# Patient Record
Sex: MALE | Race: WHITE | NOT HISPANIC OR LATINO | Employment: OTHER | URBAN - METROPOLITAN AREA
[De-identification: names, ages, dates, MRNs, and addresses within clinical notes are randomized per-mention and may not be internally consistent; named-entity substitution may affect disease eponyms.]

---

## 2018-10-03 ENCOUNTER — HOSPITAL ENCOUNTER (INPATIENT)
Facility: HOSPITAL | Age: 83
LOS: 4 days | Discharge: HOME HEALTH | DRG: 726 | End: 2018-10-07
Attending: EMERGENCY MEDICINE | Admitting: INTERNAL MEDICINE
Payer: MEDICARE

## 2018-10-03 DIAGNOSIS — N13.39 OTHER HYDRONEPHROSIS: ICD-10-CM

## 2018-10-03 DIAGNOSIS — R33.9 URINARY RETENTION WITH INCOMPLETE BLADDER EMPTYING: Primary | ICD-10-CM

## 2018-10-03 DIAGNOSIS — R33.8 ACUTE URINARY RETENTION: ICD-10-CM

## 2018-10-03 DIAGNOSIS — N19 RENAL FAILURE: ICD-10-CM

## 2018-10-03 PROBLEM — G25.0 ESSENTIAL TREMOR: Status: ACTIVE | Noted: 2018-10-03

## 2018-10-03 PROBLEM — E78.49 OTHER HYPERLIPIDEMIA: Status: ACTIVE | Noted: 2018-10-03

## 2018-10-03 PROBLEM — N17.9 ACUTE RENAL FAILURE SUPERIMPOSED ON STAGE 3 CHRONIC KIDNEY DISEASE (HCC): Status: ACTIVE | Noted: 2018-10-03

## 2018-10-03 PROBLEM — N18.30 ACUTE RENAL FAILURE SUPERIMPOSED ON STAGE 3 CHRONIC KIDNEY DISEASE (HCC): Status: ACTIVE | Noted: 2018-10-03

## 2018-10-03 LAB
ANION GAP SERPL CALCULATED.3IONS-SCNC: 11 MMOL/L (ref 4–13)
BILIRUB UR QL STRIP: NEGATIVE
BUN SERPL-MCNC: 48 MG/DL (ref 5–25)
CALCIUM SERPL-MCNC: 9.1 MG/DL (ref 8.3–10.1)
CHLORIDE SERPL-SCNC: 99 MMOL/L (ref 100–108)
CLARITY UR: CLEAR
CO2 SERPL-SCNC: 28 MMOL/L (ref 21–32)
COLOR UR: YELLOW
CREAT SERPL-MCNC: 2.36 MG/DL (ref 0.6–1.3)
GFR SERPL CREATININE-BSD FRML MDRD: 25 ML/MIN/1.73SQ M
GLUCOSE SERPL-MCNC: 106 MG/DL (ref 65–140)
GLUCOSE UR STRIP-MCNC: NEGATIVE MG/DL
HGB UR QL STRIP.AUTO: ABNORMAL
KETONES UR STRIP-MCNC: NEGATIVE MG/DL
LEUKOCYTE ESTERASE UR QL STRIP: ABNORMAL
NITRITE UR QL STRIP: NEGATIVE
PH UR STRIP.AUTO: 6 [PH] (ref 4.5–8)
POTASSIUM SERPL-SCNC: 3.8 MMOL/L (ref 3.5–5.3)
PROT UR STRIP-MCNC: NEGATIVE MG/DL
SODIUM SERPL-SCNC: 138 MMOL/L (ref 136–145)
SP GR UR STRIP.AUTO: 1.01 (ref 1–1.03)
UROBILINOGEN UR QL STRIP.AUTO: 0.2 E.U./DL

## 2018-10-03 PROCEDURE — 80048 BASIC METABOLIC PNL TOTAL CA: CPT | Performed by: EMERGENCY MEDICINE

## 2018-10-03 PROCEDURE — 51798 US URINE CAPACITY MEASURE: CPT

## 2018-10-03 PROCEDURE — G0008 ADMIN INFLUENZA VIRUS VAC: HCPCS | Performed by: INTERNAL MEDICINE

## 2018-10-03 PROCEDURE — 99285 EMERGENCY DEPT VISIT HI MDM: CPT

## 2018-10-03 PROCEDURE — 99222 1ST HOSP IP/OBS MODERATE 55: CPT | Performed by: INTERNAL MEDICINE

## 2018-10-03 PROCEDURE — 36415 COLL VENOUS BLD VENIPUNCTURE: CPT | Performed by: EMERGENCY MEDICINE

## 2018-10-03 PROCEDURE — 81002 URINALYSIS NONAUTO W/O SCOPE: CPT | Performed by: EMERGENCY MEDICINE

## 2018-10-03 PROCEDURE — 90662 IIV NO PRSV INCREASED AG IM: CPT | Performed by: INTERNAL MEDICINE

## 2018-10-03 PROCEDURE — 0T9B70Z DRAINAGE OF BLADDER WITH DRAINAGE DEVICE, VIA NATURAL OR ARTIFICIAL OPENING: ICD-10-PCS | Performed by: EMERGENCY MEDICINE

## 2018-10-03 RX ORDER — PROPRANOLOL HYDROCHLORIDE 60 MG/1
60 TABLET ORAL EVERY MORNING
COMMUNITY

## 2018-10-03 RX ORDER — ATORVASTATIN CALCIUM 40 MG/1
40 TABLET, FILM COATED ORAL DAILY
COMMUNITY
End: 2018-12-28

## 2018-10-03 RX ORDER — GABAPENTIN 100 MG/1
100 CAPSULE ORAL 2 TIMES DAILY
Status: DISCONTINUED | OUTPATIENT
Start: 2018-10-03 | End: 2018-10-07 | Stop reason: HOSPADM

## 2018-10-03 RX ORDER — TAMSULOSIN HYDROCHLORIDE 0.4 MG/1
0.4 CAPSULE ORAL
Status: DISCONTINUED | OUTPATIENT
Start: 2018-10-03 | End: 2018-10-07 | Stop reason: HOSPADM

## 2018-10-03 RX ORDER — HYDROCHLOROTHIAZIDE 25 MG/1
25 TABLET ORAL DAILY
COMMUNITY
End: 2018-10-07 | Stop reason: HOSPADM

## 2018-10-03 RX ORDER — ASPIRIN 81 MG/1
81 TABLET ORAL DAILY
Status: DISCONTINUED | OUTPATIENT
Start: 2018-10-04 | End: 2018-10-07 | Stop reason: HOSPADM

## 2018-10-03 RX ORDER — GABAPENTIN 100 MG/1
100 CAPSULE ORAL 2 TIMES DAILY
COMMUNITY

## 2018-10-03 RX ORDER — HEPARIN SODIUM 5000 [USP'U]/ML
5000 INJECTION, SOLUTION INTRAVENOUS; SUBCUTANEOUS EVERY 8 HOURS SCHEDULED
Status: DISCONTINUED | OUTPATIENT
Start: 2018-10-03 | End: 2018-10-07 | Stop reason: HOSPADM

## 2018-10-03 RX ORDER — ATORVASTATIN CALCIUM 40 MG/1
40 TABLET, FILM COATED ORAL 3 TIMES WEEKLY
Status: DISCONTINUED | OUTPATIENT
Start: 2018-10-03 | End: 2018-10-07 | Stop reason: HOSPADM

## 2018-10-03 RX ORDER — SODIUM CHLORIDE 9 MG/ML
125 INJECTION, SOLUTION INTRAVENOUS CONTINUOUS
Status: DISCONTINUED | OUTPATIENT
Start: 2018-10-03 | End: 2018-10-07 | Stop reason: HOSPADM

## 2018-10-03 RX ORDER — ASPIRIN 81 MG/1
81 TABLET ORAL EVERY EVENING
COMMUNITY

## 2018-10-03 RX ORDER — FINASTERIDE 5 MG/1
5 TABLET, FILM COATED ORAL DAILY
Status: DISCONTINUED | OUTPATIENT
Start: 2018-10-04 | End: 2018-10-07 | Stop reason: HOSPADM

## 2018-10-03 RX ORDER — PROPRANOLOL HYDROCHLORIDE 20 MG/1
20 TABLET ORAL
Status: DISCONTINUED | OUTPATIENT
Start: 2018-10-03 | End: 2018-10-07 | Stop reason: HOSPADM

## 2018-10-03 RX ORDER — PROPRANOLOL HYDROCHLORIDE 20 MG/1
20 TABLET ORAL EVERY EVENING
COMMUNITY

## 2018-10-03 RX ORDER — PROPRANOLOL HYDROCHLORIDE 20 MG/1
60 TABLET ORAL DAILY
Status: DISCONTINUED | OUTPATIENT
Start: 2018-10-04 | End: 2018-10-07 | Stop reason: HOSPADM

## 2018-10-03 RX ADMIN — SODIUM CHLORIDE 100 ML/HR: 0.9 INJECTION, SOLUTION INTRAVENOUS at 20:39

## 2018-10-03 RX ADMIN — TAMSULOSIN HYDROCHLORIDE 0.4 MG: 0.4 CAPSULE ORAL at 20:39

## 2018-10-03 RX ADMIN — PROPRANOLOL HYDROCHLORIDE 20 MG: 20 TABLET ORAL at 19:00

## 2018-10-03 RX ADMIN — GABAPENTIN 100 MG: 100 CAPSULE ORAL at 20:39

## 2018-10-03 RX ADMIN — INFLUENZA A VIRUS A/MICHIGAN/45/2015 X-275 (H1N1) ANTIGEN (FORMALDEHYDE INACTIVATED), INFLUENZA A VIRUS A/SINGAPORE/INFIMH-16-0019/2016 IVR-186 (H3N2) ANTIGEN (FORMALDEHYDE INACTIVATED), AND INFLUENZA B VIRUS B/MARYLAND/15/2016 BX-69A (A B/COLORADO/6/2017-LIKE VIRUS) ANTIGEN (FORMALDEHYDE INACTIVATED) 0.5 ML: 60; 60; 60 INJECTION, SUSPENSION INTRAMUSCULAR at 21:40

## 2018-10-03 RX ADMIN — ATORVASTATIN CALCIUM 40 MG: 40 TABLET, FILM COATED ORAL at 20:00

## 2018-10-03 RX ADMIN — HEPARIN SODIUM 5000 UNITS: 5000 INJECTION INTRAVENOUS; SUBCUTANEOUS at 21:39

## 2018-10-03 NOTE — ED PROVIDER NOTES
History  Chief Complaint   Patient presents with    Abdominal Pain     seen at [facility], had ultrasound done and was told to come to ED for evaluation of abdominal pain and urinary retention  States "my bladder is full and I need a catheter"  81 yo male sent to the ED from outpatient ultrasound in [place] for evaluation of urinary retention, "distended bladder" seen on the ultrasound by verbal report from [facility]  He was getting the ultrasound to evaluate kidney and bladder based on blood work taken 9/4/18 by his PCP showing elevation of CRT and decreased GFR since last checked 12/2017  Today he was told to drink a quart of liquid and then not void until after the ultrasound  Apparently then he was unable to urinate which corresponded to full bladder by the ultrasound  Since transferring here, and then waiting, he was able to void and relieve some of the pressure  He denies pain, fever, chills, fever, chills, N/V, hematuria  History provided by:  Patient      Prior to Admission Medications   Prescriptions Last Dose Informant Patient Reported?  Taking?   aspirin (ECOTRIN LOW STRENGTH) 81 mg EC tablet   Yes Yes   Sig: Take 81 mg by mouth daily   atorvastatin (LIPITOR) 40 mg tablet   Yes Yes   Sig: Take 40 mg by mouth daily   gabapentin (NEURONTIN) 100 mg capsule   Yes Yes   Sig: Take 100 mg by mouth 2 (two) times a day   hydrochlorothiazide (HYDRODIURIL) 25 mg tablet   Yes Yes   Sig: Take 25 mg by mouth daily   propranolol (INDERAL) 20 mg tablet   Yes Yes   Sig: Take 20 mg by mouth daily after breakfast   propranolol (INDERAL) 60 mg tablet   Yes Yes   Sig: Take 60 mg by mouth daily      Facility-Administered Medications: None       Past Medical History:   Diagnosis Date    BPH (benign prostatic hyperplasia)     Essential tremor     Hyperlipidemia     Hypertension        Past Surgical History:   Procedure Laterality Date    HIP SURGERY      TONSILECTOMY AND ADNOIDECTOMY         Family History   Problem Relation Age of Onset    Heart disease Mother      I have reviewed and agree with the history as documented  Social History   Substance Use Topics    Smoking status: Never Smoker    Smokeless tobacco: Never Used    Alcohol use No        Review of Systems   Constitutional: Negative for appetite change, chills and fever  HENT: Negative for sore throat  Respiratory: Negative for cough, shortness of breath and wheezing  Cardiovascular: Negative for chest pain and palpitations  Gastrointestinal: Negative for abdominal pain, diarrhea, nausea and vomiting  Genitourinary: Positive for decreased urine volume, difficulty urinating and urgency  Negative for dysuria and hematuria  Musculoskeletal: Negative for neck pain  Skin: Negative for rash  Neurological: Negative for dizziness, weakness and headaches  Psychiatric/Behavioral: Negative for suicidal ideas  All other systems reviewed and are negative  Physical Exam  Physical Exam   Constitutional: He is oriented to person, place, and time  He appears well-developed and well-nourished  Non-toxic appearance  HENT:   Head: Normocephalic  Right Ear: Tympanic membrane and external ear normal    Left Ear: External ear normal    Nose: Nose normal    Mouth/Throat: Oropharynx is clear and moist    Eyes: Pupils are equal, round, and reactive to light  Conjunctivae, EOM and lids are normal    Neck: Normal range of motion  Neck supple  No JVD present  No Brudzinski's sign and no Kernig's sign noted  Cardiovascular: Normal rate, regular rhythm and normal heart sounds  No murmur heard  Pulmonary/Chest: Effort normal and breath sounds normal  No accessory muscle usage  No tachypnea  No respiratory distress  He has no wheezes  Abdominal: Soft  Normal appearance and bowel sounds are normal  He exhibits no distension and no mass  There is no tenderness  There is no rigidity, no rebound and no guarding     Musculoskeletal: Normal range of motion  Lymphadenopathy:        Head (right side): No submental, no submandibular, no preauricular and no posterior auricular adenopathy present  Head (left side): No submental, no submandibular, no preauricular and no posterior auricular adenopathy present  He has no cervical adenopathy  Neurological: He is alert and oriented to person, place, and time  He has normal strength and normal reflexes  He displays tremor (baseline)  No cranial nerve deficit or sensory deficit  Coordination normal  GCS eye subscore is 4  GCS verbal subscore is 5  GCS motor subscore is 6  Skin: Skin is warm and dry  No rash noted  He is not diaphoretic  Psychiatric: He has a normal mood and affect  His speech is normal and behavior is normal  Thought content normal  Cognition and memory are normal    Nursing note and vitals reviewed        Vital Signs  ED Triage Vitals   Temperature Pulse Respirations Blood Pressure SpO2   10/03/18 1304 10/03/18 1304 10/03/18 1304 10/03/18 1304 10/03/18 1304   98 9 °F (37 2 °C) 82 18 (!) 182/78 98 %      Temp Source Heart Rate Source Patient Position - Orthostatic VS BP Location FiO2 (%)   10/03/18 1845 10/03/18 1304 10/03/18 1304 10/03/18 1514 --   Temporal Monitor Sitting Right arm       Pain Score       10/03/18 1304       5           Vitals:    10/03/18 1304 10/03/18 1514 10/03/18 1742 10/03/18 1845   BP: (!) 182/78 163/80 (!) 173/81 (!) 171/94   Pulse: 82 78 72 74   Patient Position - Orthostatic VS: Sitting Lying Lying Lying       Visual Acuity      ED Medications  Medications   aspirin (ECOTRIN LOW STRENGTH) EC tablet 81 mg (not administered)   atorvastatin (LIPITOR) tablet 40 mg (40 mg Oral Given 10/3/18 2000)   gabapentin (NEURONTIN) capsule 100 mg (100 mg Oral Given 10/3/18 2039)   propranolol (INDERAL) tablet 20 mg (20 mg Oral Given 10/3/18 1900)   propranolol (INDERAL) tablet 60 mg (not administered)   heparin (porcine) subcutaneous injection 5,000 Units (5,000 Units Subcutaneous Given 10/3/18 2139)   sodium chloride 0 9 % infusion (100 mL/hr Intravenous New Bag 10/3/18 2039)   tamsulosin (FLOMAX) capsule 0 4 mg (0 4 mg Oral Given 10/3/18 2039)   finasteride (PROSCAR) tablet 5 mg (not administered)   influenza vaccine, high-dose (FLUZONE HIGH-DOSE) IM injection MOLLY 0 5 mL (not administered)       Diagnostic Studies  Results Reviewed     Procedure Component Value Units Date/Time    Urine Microscopic [[id number]] Collected:  10/03/18 1656    Lab Status:  No result Specimen:  Urine from Urine, Clean Catch     POCT urinalysis dipstick [[id number]]  (Abnormal) Resulted:  10/03/18 1647    Lab Status:  Final result Specimen:  Urine Updated:  10/03/18 1647    ED Urine Macroscopic [[id number]]  (Abnormal) Collected:  10/03/18 1656    Lab Status:  Final result Specimen:  Urine Updated:  10/03/18 1645     Color, UA Yellow     Clarity, UA Clear     pH, UA 6 0     Leukocytes, UA Large (A)     Nitrite, UA Negative     Protein, UA Negative mg/dl      Glucose, UA Negative mg/dl      Ketones, UA Negative mg/dl      Urobilinogen, UA 0 2 E U /dl      Bilirubin, UA Negative     Blood, UA Trace (A)     Specific [place], UA 1 010    Narrative:       CLINITEK RESULT    Basic metabolic panel [[id number]]  (Abnormal) Collected:  10/03/18 1522    Lab Status:  Final result Specimen:  Blood from Arm, Left Updated:  10/03/18 1536     Sodium 138 mmol/L      Potassium 3 8 mmol/L      Chloride 99 (L) mmol/L      CO2 28 mmol/L      ANION GAP 11 mmol/L      BUN 48 (H) mg/dL      Creatinine 2 36 (H) mg/dL      Glucose 106 mg/dL      Calcium 9 1 mg/dL      eGFR 25 ml/min/1 73sq m     Narrative:         National Kidney Disease Education Program recommendations are as follows:  GFR calculation is accurate only with a steady state creatinine  Chronic Kidney disease less than 60 ml/min/1 73 sq  meters  Kidney failure less than 15 ml/min/1 73 sq  meters                   No orders to display Procedures  Procedures       Phone Contacts  ED Phone Contact    ED Course  ED Course as of Oct 03 2141   Wed Oct 03, 2018   1553 Elevated since 9/4/18 Creatinine: (!) 2 36   1553 Reviewed results with patient at bedside and updated on the plan Bladder scan significantly elevated, likely corresponds to longer standing obstructive uropathy and overflow incontinence that patient was recognizing  Will need sifuentes catheter  Maco D/W Mary Almanza for urology who agrees with admission for consult, observation for postobstructive diuresis                                MDM  CritCare Time    Disposition  Final diagnoses:   Urinary retention with incomplete bladder emptying   Renal failure     Time reflects when diagnosis was documented in both MDM as applicable and the Disposition within this note     Time User Action Codes Description Comment    10/3/2018  4:58 PM Jie ALLISON Add [R33 9] Urinary retention with incomplete bladder emptying     10/3/2018  4:59 PM Mandy Hector Add [N19] Renal failure       ED Disposition     ED Disposition Condition Comment    Admit  Case was discussed with Dr Zia Argueta and the patient's admission status was agreed to be Admission Status: inpatient status to the service of Dr Zia Argueta   Follow-up Information    None         Current Discharge Medication List      CONTINUE these medications which have NOT CHANGED    Details   aspirin (ECOTRIN LOW STRENGTH) 81 mg EC tablet Take 81 mg by mouth daily      atorvastatin (LIPITOR) 40 mg tablet Take 40 mg by mouth daily      gabapentin (NEURONTIN) 100 mg capsule Take 100 mg by mouth 2 (two) times a day      hydrochlorothiazide (HYDRODIURIL) 25 mg tablet Take 25 mg by mouth daily      ! ! propranolol (INDERAL) 20 mg tablet Take 20 mg by mouth daily after breakfast      !! propranolol (INDERAL) 60 mg tablet Take 60 mg by mouth daily       ! ! - Potential duplicate medications found  Please discuss with provider          No discharge procedures on file      ED Provider  Electronically Signed by           Fred Parrish MD  10/03/18 3424

## 2018-10-03 NOTE — ASSESSMENT & PLAN NOTE
Likely due to prostatic hypertrophy    Status post Valentine catheter placement   Start Flomax 0 4 mg daily, Proscar 5 mg daily  Consult Urology  Obtain outpatient KUB ultrasound result

## 2018-10-03 NOTE — H&P
H&P- Mily Ruiz 1935, 80 y o  male MRN: [id number]    Unit/Bed#: [address] [address] 224-02 Encounter: [id number]    Primary Care Provider: Dagoberto Mariee MD   Date and time admitted to hospital: 10/3/2018  2:15 PM        Acute urinary retention   Assessment & Plan    Likely due to prostatic hypertrophy  Status post Valentine catheter placement   Start Flomax 0 4 mg daily, Proscar 5 mg daily  Consult Urology  Obtain outpatient KUB ultrasound result     Acute renal failure superimposed on stage 3 chronic kidney disease ([address] )   Assessment & Plan    Secondary to obstructive uropathy  Hold HCTZ  Start normal saline at 100 mL/hour  Continue Valentine catheter     Other hyperlipidemia   Assessment & Plan    Continue Lipitor every Monday Wednesday Friday     Essential tremor   Assessment & Plan    Continue propranolol 60 mg in the morning and 20 mg in the evening             VTE Prophylaxis: Heparin     Code Status:  Level 1 full code  POLST: There is no POLST form on file for this patient (pre-hospital)  Discussion with family:  Yes    Anticipated Length of Stay:  Patient will be admitted on an Inpatient basis with an anticipated length of stay of  at least 2 midnights  Justification for Hospital Stay:  Acute renal failure    Total Time for Visit, including Counseling / Coordination of Care: 45 minutes  Greater than 50% of this total time spent on direct patient counseling and coordination of care  Chief Complaint:   Abnormal imaging    History of Present Illness:    Mily Ruiz is a 80 y o  male who presents with urinary retention  The patient was sent to the hospital after undergoing an outpatient ultrasound of the kidney ureter and bladder  He reportedly had bladder distention and was sent to the ER immediately  While in the ER, a bladder scan was performed which revealed more than 900 mL of fluid within the bladder  A Valentine catheter was placed with brisk diuresis thereafter   On further questioning the patient has had lower urinary tract symptoms for several months  He reports weak urinary stream, dribbling, urinary frequency, feeling of incomplete emptying  He denied fever, chills or burning  He denied nocturia  He has chronic tremors of the head and neck being treated with propranolol  Review of Systems:    Review of Systems   Constitutional: Negative  HENT: Negative  Eyes: Negative  Respiratory: Negative  Cardiovascular: Negative  Genitourinary: Positive for decreased urine volume, frequency and urgency  Musculoskeletal: Negative  Neurological: Positive for tremors  Psychiatric/Behavioral: Negative  All other systems reviewed and are negative  Past Medical and Surgical History:     Past Medical History:   Diagnosis Date    BPH (benign prostatic hyperplasia)     Essential tremor     Hyperlipidemia     Hypertension        Past Surgical History:   Procedure Laterality Date    HIP SURGERY      TONSILECTOMY AND ADNOIDECTOMY         Meds/Allergies:    Prior to Admission medications    Medication Sig Start Date End Date Taking? Authorizing Provider   aspirin (ECOTRIN LOW STRENGTH) 81 mg EC tablet Take 81 mg by mouth daily   Yes Historical Provider, MD   atorvastatin (LIPITOR) 40 mg tablet Take 40 mg by mouth daily   Yes Historical Provider, MD   gabapentin (NEURONTIN) 100 mg capsule Take 100 mg by mouth 2 (two) times a day   Yes Historical Provider, MD   hydrochlorothiazide (HYDRODIURIL) 25 mg tablet Take 25 mg by mouth daily   Yes Historical Provider, MD   propranolol (INDERAL) 20 mg tablet Take 20 mg by mouth daily after breakfast   Yes Historical Provider, MD   propranolol (INDERAL) 60 mg tablet Take 60 mg by mouth daily   Yes Historical Provider, MD     I have reviewed home medications with patient personally  Allergies:    Allergies   Allergen Reactions    Cefuroxime Hives       Social History:     Marital Status: /Civil Union   Occupation:  Retired microbiologist  Patient Pre-hospital Living Situation:  Lives with wife  Patient Pre-hospital Level of Mobility:  Independent  Patient Pre-hospital Diet Restrictions:  None  Substance Use History:   History   Alcohol Use No     History   Smoking Status    Never Smoker   Smokeless Tobacco    Never Used     History   Drug Use No       Family History:    Family History   Problem Relation Age of Onset    Heart disease Mother        Physical Exam:     Vitals:   Blood Pressure: (!) 171/94 (10/03/18 1845)  Pulse: 74 (10/03/18 1845)  Temperature: 97 6 °F (36 4 °C) (10/03/18 1845)  Temp Source: Temporal (10/03/18 1845)  Respirations: 17 (10/03/18 1845)  Weight - Scale: 99 8 kg (220 lb) (10/03/18 1304)  SpO2: 97 % (10/03/18 1845)    Physical Exam   Constitutional: He is oriented to person, place, and time  He appears well-developed and well-nourished  HENT:   Head: Normocephalic and atraumatic  Eyes: No scleral icterus  Neck: No JVD present  Cardiovascular: Regular rhythm  No murmur heard  Pulmonary/Chest: Effort normal  No respiratory distress  He has no wheezes  He has no rales  Abdominal: Soft  He exhibits no distension  There is no tenderness  Genitourinary:   Genitourinary Comments: Valentine with clear urine output   Musculoskeletal: He exhibits no edema  Neurological: He is alert and oriented to person, place, and time  Tremors of the neck and head   Skin: Skin is warm and dry  Psychiatric: He has a normal mood and affect  Vitals reviewed  Additional Data:     Lab Results: I have personally reviewed pertinent reports  Results from last 7 days  Lab Units 10/03/18  1522   SODIUM mmol/L 138   POTASSIUM mmol/L 3 8   CHLORIDE mmol/L 99*   CO2 mmol/L 28   BUN mg/dL 48*   CREATININE mg/dL 2 36*   ANION GAP mmol/L 11   CALCIUM mg/dL 9 1                       Imaging:  Outpatient record unavailable    No orders to display       EKG, Pathology, and Other Studies Reviewed on Admission: · EKG: None    Allscripts / Epic Records Reviewed: Yes     ** Please Note: This note has been constructed using a voice recognition system   **

## 2018-10-04 ENCOUNTER — TELEPHONE (OUTPATIENT)
Dept: OTHER | Facility: HOSPITAL | Age: 83
End: 2018-10-04

## 2018-10-04 LAB
ANION GAP SERPL CALCULATED.3IONS-SCNC: 9 MMOL/L (ref 4–13)
BASOPHILS # BLD AUTO: 0.04 THOUSANDS/ΜL (ref 0–0.1)
BASOPHILS NFR BLD AUTO: 0 % (ref 0–1)
BUN SERPL-MCNC: 42 MG/DL (ref 5–25)
CALCIUM SERPL-MCNC: 8.8 MG/DL (ref 8.3–10.1)
CHLORIDE SERPL-SCNC: 104 MMOL/L (ref 100–108)
CO2 SERPL-SCNC: 29 MMOL/L (ref 21–32)
CREAT SERPL-MCNC: 2.31 MG/DL (ref 0.6–1.3)
EOSINOPHIL # BLD AUTO: 0.13 THOUSAND/ΜL (ref 0–0.61)
EOSINOPHIL NFR BLD AUTO: 1 % (ref 0–6)
ERYTHROCYTE [DISTWIDTH] IN BLOOD BY AUTOMATED COUNT: 14.3 % (ref 11.6–15.1)
GFR SERPL CREATININE-BSD FRML MDRD: 25 ML/MIN/1.73SQ M
GLUCOSE SERPL-MCNC: 103 MG/DL (ref 65–140)
HCT VFR BLD AUTO: 29.4 % (ref 36.5–49.3)
HGB BLD-MCNC: 9.7 G/DL (ref 12–17)
IMM GRANULOCYTES # BLD AUTO: 0.05 THOUSAND/UL (ref 0–0.2)
IMM GRANULOCYTES NFR BLD AUTO: 0 % (ref 0–2)
LYMPHOCYTES # BLD AUTO: 2.62 THOUSANDS/ΜL (ref 0.6–4.47)
LYMPHOCYTES NFR BLD AUTO: 22 % (ref 14–44)
MCH RBC QN AUTO: 34.2 PG (ref 26.8–34.3)
MCHC RBC AUTO-ENTMCNC: 33 G/DL (ref 31.4–37.4)
MCV RBC AUTO: 104 FL (ref 82–98)
MONOCYTES # BLD AUTO: 0.83 THOUSAND/ΜL (ref 0.17–1.22)
MONOCYTES NFR BLD AUTO: 7 % (ref 4–12)
NEUTROPHILS # BLD AUTO: 8.15 THOUSANDS/ΜL (ref 1.85–7.62)
NEUTS SEG NFR BLD AUTO: 70 % (ref 43–75)
NRBC BLD AUTO-RTO: 0 /100 WBCS
PLATELET # BLD AUTO: 286 THOUSANDS/UL (ref 149–390)
PMV BLD AUTO: 9.3 FL (ref 8.9–12.7)
POTASSIUM SERPL-SCNC: 3.5 MMOL/L (ref 3.5–5.3)
RBC # BLD AUTO: 2.84 MILLION/UL (ref 3.88–5.62)
SODIUM SERPL-SCNC: 142 MMOL/L (ref 136–145)
WBC # BLD AUTO: 11.82 THOUSAND/UL (ref 4.31–10.16)

## 2018-10-04 PROCEDURE — 85025 COMPLETE CBC W/AUTO DIFF WBC: CPT | Performed by: INTERNAL MEDICINE

## 2018-10-04 PROCEDURE — 80048 BASIC METABOLIC PNL TOTAL CA: CPT | Performed by: INTERNAL MEDICINE

## 2018-10-04 PROCEDURE — 99221 1ST HOSP IP/OBS SF/LOW 40: CPT | Performed by: PHYSICIAN ASSISTANT

## 2018-10-04 PROCEDURE — 99232 SBSQ HOSP IP/OBS MODERATE 35: CPT | Performed by: INTERNAL MEDICINE

## 2018-10-04 RX ADMIN — ASPIRIN 81 MG: 81 TABLET, COATED ORAL at 08:25

## 2018-10-04 RX ADMIN — SODIUM CHLORIDE 125 ML/HR: 0.9 INJECTION, SOLUTION INTRAVENOUS at 22:52

## 2018-10-04 RX ADMIN — HEPARIN SODIUM 5000 UNITS: 5000 INJECTION INTRAVENOUS; SUBCUTANEOUS at 21:09

## 2018-10-04 RX ADMIN — PROPRANOLOL HYDROCHLORIDE 60 MG: 20 TABLET ORAL at 08:23

## 2018-10-04 RX ADMIN — FINASTERIDE 5 MG: 5 TABLET, FILM COATED ORAL at 08:24

## 2018-10-04 RX ADMIN — HEPARIN SODIUM 5000 UNITS: 5000 INJECTION INTRAVENOUS; SUBCUTANEOUS at 06:28

## 2018-10-04 RX ADMIN — GABAPENTIN 100 MG: 100 CAPSULE ORAL at 08:24

## 2018-10-04 RX ADMIN — SODIUM CHLORIDE 125 ML/HR: 0.9 INJECTION, SOLUTION INTRAVENOUS at 14:58

## 2018-10-04 RX ADMIN — GABAPENTIN 100 MG: 100 CAPSULE ORAL at 17:21

## 2018-10-04 RX ADMIN — PROPRANOLOL HYDROCHLORIDE 20 MG: 20 TABLET ORAL at 17:20

## 2018-10-04 RX ADMIN — HEPARIN SODIUM 5000 UNITS: 5000 INJECTION INTRAVENOUS; SUBCUTANEOUS at 14:57

## 2018-10-04 RX ADMIN — TAMSULOSIN HYDROCHLORIDE 0.4 MG: 0.4 CAPSULE ORAL at 17:20

## 2018-10-04 NOTE — PROGRESS NOTES
Progress Note - Hans Mason 1935, 80 y o  male MRN: [id number]    Unit/Bed#: [facility] [address] 224-02 Encounter: [id number]    Primary Care Provider: Saeid Bone MD   Date and time admitted to hospital: 10/3/2018  2:15 PM        * Acute urinary retention   Assessment & Plan    Likely due to prostatic hypertrophy  Status post Valentine catheter placement   Continue Flomax 0 4 mg daily, Proscar 5 mg daily  Per Urology, maintain Valentine catheter for 7-10 days before removal  Obtain outpatient KUB ultrasound result     Acute renal failure superimposed on stage 3 chronic kidney disease ([facility] [address] )   Assessment & Plan    Secondary to obstructive uropathy  Hold HCTZ  Increased normal saline to 125 mL/hour  Creatinine still above the baseline  Continue Valentine catheter  Repeat BMP in a m  Other hyperlipidemia   Assessment & Plan    Continue Lipitor every      Essential tremor   Assessment & Plan    Continue propranolol 60 mg in the morning and 20 mg in the evening         VTE Pharmacologic Prophylaxis:   Pharmacologic: Heparin  Mechanical VTE Prophylaxis in Place: No    Patient Centered Rounds: Discussed with his nurse    Discussions with Specialists or Other Care Team Provider:  Discussed with Urology    Education and Discussions with Family / Patient:  Discussed with wife    Time Spent for Care: 20 minutes  More than 50% of total time spent on counseling and coordination of care as described above  Current Length of Stay: 1 day(s)    Current Patient Status: Inpatient   Certification Statement: The patient will continue to require additional inpatient hospital stay due to Acute kidney injury    Discharge Plan:  In 24-48 hours depending on renal function    Code Status: Level 1 - Full Code      Subjective:   Chronic tremors  No abdominal pain  No fever or chills      Objective:     Vitals:   Temp (24hrs), Av 5 °F (36 4 °C), Min:96 °F (35 6 °C), Max:98 9 °F (37 2 °C)    HR:  [72-84] 84  Resp: [16-18] 16  BP: (156-182)/(78-94) 156/88  SpO2:  [94 %-98 %] 98 %  There is no height or weight on file to calculate BMI  Input and Output Summary (last 24 hours): Intake/Output Summary (Last 24 hours) at 10/04/18 1115  Last data filed at 10/04/18 0405   Gross per 24 hour   Intake                0 ml   Output             4700 ml   Net            -4700 ml       Physical Exam:     Physical Exam   Constitutional: He appears well-developed and well-nourished  HENT:   Head: Normocephalic and atraumatic  Eyes: No scleral icterus  Neck: No JVD present  Cardiovascular: Regular rhythm  No murmur heard  Pulmonary/Chest: Effort normal  No respiratory distress  He has no wheezes  Abdominal: Soft  He exhibits no distension  There is no tenderness  Genitourinary:   Genitourinary Comments: Valentine with clear urine output   Musculoskeletal: He exhibits no edema or deformity  Skin: Skin is warm and dry  Psychiatric: He has a normal mood and affect  Additional Data:     Labs:      Results from last 7 days  Lab Units 10/04/18  0534   WBC Thousand/uL 11 82*   HEMOGLOBIN g/dL 9 7*   HEMATOCRIT % 29 4*   PLATELETS Thousands/uL 286   NEUTROS PCT % 70   LYMPHS PCT % 22   MONOS PCT % 7   EOS PCT % 1       Results from last 7 days  Lab Units 10/04/18  0534   SODIUM mmol/L 142   POTASSIUM mmol/L 3 5   CHLORIDE mmol/L 104   CO2 mmol/L 29   BUN mg/dL 42*   CREATININE mg/dL 2 31*   ANION GAP mmol/L 9   CALCIUM mg/dL 8 8                           * I Have Reviewed All Lab Data Listed Above  * Additional Pertinent Lab Tests Reviewed:  All Labs Within Last 24 Hours Reviewed    Imaging:    Imaging Reports Reviewed Today Include:  None      Recent Cultures (last 7 days):           Last 24 Hours Medication List:     Current Facility-Administered Medications:  aspirin 81 mg Oral Daily Enoch Ramey MD    atorvastatin 40 mg Oral Once per day on Mon Wed Fri Enoch Ramey MD    finasteride 5 mg Oral Daily Enoch Ramey MD gabapentin 100 mg Oral BID Shanice Soria MD    heparin (porcine) 5,000 Units Subcutaneous [facility] Shanice Soria MD    propranolol 20 mg Oral After Judith James MD    propranolol 60 mg Oral Daily Shanice Soria MD    sodium chloride 100 mL/hr Intravenous Continuous Shanice Soria MD Last Rate: 100 mL/hr (10/03/18 2039)   tamsulosin 0 4 mg Oral Daily With Judith James MD         Today, Patient Was Seen By: Shanice Soria MD    ** Please Note: Dictation voice to text software may have been used in the creation of this document   **

## 2018-10-04 NOTE — ASSESSMENT & PLAN NOTE
Likely due to prostatic hypertrophy    Status post Valentine catheter placement   Continue Flomax 0 4 mg daily, Proscar 5 mg daily  Per Urology, maintain Valentine catheter for 7-10 days before removal  Obtain outpatient KUB ultrasound result

## 2018-10-04 NOTE — ASSESSMENT & PLAN NOTE
Creatinine has bumped to 2 3, above his reported baseline of 1  Likely post obstructive, given high volume retention  Plan:  Maintain Valentine to gravity

## 2018-10-04 NOTE — SOCIAL WORK
CM met with patient at bedside to address discharge needs  CM pointed out name/number on the white board and communicated she was that individual  Present in the room was patient's wife; CM asked permission to speak in front of her, which was granted  Patient lives in a ranch-style home with his wife, Nancy Desai; two CAMRYN  Patient denies difficulty with steps  Patient is independent with ADLs and functional mobility  Food shopping is done by daughter as wife dislocated her shoulder recently & meal prep is done by patient's wife  Patient uses a cane for ambulation purposes  Patient is able to ambulate without assistance from a seated or laying position  Hx of VNA reported  Patient is involved in community activities; he attends a fitness classes at [place] [id number] a week, and he is very involved in a bowling league  Patient is retired  PCP identified as Dr Frederick Perry, a [address] [facility]  A paperwork is being reworked but patient's wife is permitted to be patient's healthcare representative, spokesperson for the family and designated caregiver if/when needed  CM provided the [employer] book and Advanced Directive brochure to avoid patient having to go to an  for [place] paperwork  Due to patient's insurance, all his medications are provided through the mail  Patient does drive; reports his daughter is available to transport home  Is this not a readmission within the last 30 days  Patient reports he has and takes all his prescribed medications; he also will have a f/u urology appointment at discharge  Patient does not have any needs/concerns that he would like CM to address while he was here  No discharge needs present at this time  CM will remain available and follow as needed  CM also encouraged patient to follow up with all recommended appointments after discharge  Patient advised of importance for patient and family to participate in managing patients medical well being

## 2018-10-04 NOTE — TELEPHONE ENCOUNTER
Mr  Tripp Parent is an 51-year-old male seen in hospital consultation for urinary retention status post Valentine catheter placement  Please contact patient/caregiver with hospital follow-up appointment with Dr Giuseppe Lal  and for void trial in 10--14  days  Thank you

## 2018-10-04 NOTE — CONSULTS
Consult - Urology   Quentin Werner 1935, 80 y o  male MRN: [id number]    Unit/Bed#: [place] -02 Encounter: [id number]    Acute renal failure superimposed on stage 3 chronic kidney disease ([facility] [address] )   Assessment & Plan    Creatinine has bumped to 2 3, above his reported baseline of 1  Likely post obstructive, given high volume retention  Plan:  Maintain Valentine to gravity  * Acute urinary retention   Assessment & Plan    Acute on chronic, given symptomatology pre-hospital   High volume retention with over 1 L on placement of Valentine catheter in the emergency department  Plan:  Given degree of retention and bladder distension, will require Valentine catheter decompression to gravity drainage for at least 7-10 days before removal and voiding trial as an outpatient  Discussed this with the patient  He is clear expectations that he will be discharged from the hospital with the Valentine catheter  Agree with Flomax and Proscar  Obtain outside renal ultrasound results  Bedside rounds performed with Vanessa Clements RN  Subjective/Objective     Subjective:   History is obtained from the patient and chart review  He presented to the emergency department with complaint of urinary retention after recent outpatient ultrasound kidney, ureters, bladder  He was found to have high volume retention of that ultrasound and again in the ER with a bladder scan volume greater than 900 cc  He had Valentine catheter placed without issue with greater than 1200 cc return  In retrospect, he reports lower abdominal discomfort prior to placement of the Valentine catheter but is hesitant to call it true pain  At home he reports weak urinary stream with postvoid dribbling, frequency, nocturia, incontinence at night and the feeling of incomplete emptying  He denies any previous prescription urologic medications but notes that he was taking something herbal for his prostate, he is unsure this had any effect on his urinary symptoms    He reports a history of seeing a urologist many years ago  Denies any previous  manipulation or surgery, TURP for other prostate procedure  He was admitted to the Internal Medicine service, started on Flomax and Proscar  Urology consultation is requested relative to this high volume urinary retention  This morning he feels well, he has no issues specific to the Valentine catheter  He reports some penile discomfort when the Valentine catheter is manipulated  No significant bladder spasms  It drained well overnight  He reports a normal appetite with no abdominal pain nausea or vomiting today  No chest pain or shortness of breath  He has chronic head neck tremors, treated with beta-blocker at home, unchanged  Nursing notes reviewed, discussed with Anna Trevizo RN caring for the patient  Urine was clear leonora overnight with a slight pink tinged  No significant clots  No issues with drainage  Did not require irrigation  Review of Systems   Constitutional: Negative for activity change and appetite change  HENT: Negative for congestion and ear pain  Eyes: Negative for pain  Respiratory: Negative for cough and shortness of breath  Cardiovascular: Negative for chest pain and palpitations  Gastrointestinal: Negative for abdominal distention, abdominal pain, blood in stool, constipation, diarrhea and nausea  Genitourinary: Positive for difficulty urinating (High volume retention, see HPI)  Negative for dysuria, flank pain, hematuria, penile pain, penile swelling, scrotal swelling, testicular pain and urgency  Musculoskeletal: Negative for arthralgias and myalgias  Skin: Negative for rash  Allergic/Immunologic: Negative for immunocompromised state  Neurological: Negative for dizziness and headaches  Hematological: Negative for adenopathy  Does not bruise/bleed easily  Psychiatric/Behavioral: Negative for agitation  The patient is not nervous/anxious          Objective:  Vitals: Blood pressure 156/88, pulse 84, temperature (!) 96 °F (35 6 °C), temperature source Tympanic, resp  rate 16, weight 99 8 kg (220 lb), SpO2 98 %  ,There is no height or weight on file to calculate BMI  Intake/Output Summary (Last 24 hours) at 10/04/18 0914  Last data filed at 10/04/18 0405   Gross per 24 hour   Intake                0 ml   Output             4700 ml   Net            -4700 ml       Invasive Devices     Peripheral Intravenous Line            Peripheral IV 10/03/18 Left Antecubital less than 1 day          Drain            Urethral Catheter 14 Fr  less than 1 day                Physical Exam   Constitutional: He is oriented to person, place, and time  He appears well-developed and well-nourished  He is cooperative  He does not appear ill  No distress  Pleasant, shaky 80-year-old male, sitting upright in bed, eating breakfast    HENT:   Head: Normocephalic and atraumatic  Moist mucous membranes  Eyes: Conjunctivae and EOM are normal    Neck: Normal range of motion  Neck supple  No tracheal deviation present  Cardiovascular: Normal rate, regular rhythm and normal heart sounds  No murmur heard  Pulmonary/Chest: Effort normal and breath sounds normal  No respiratory distress  He has no wheezes  Good airflow bilaterally on deep inspiration  Abdominal: Soft  Bowel sounds are normal  He exhibits no distension and no mass  There is no tenderness  Abdomen soft without significant suprapubic fullness  Genitourinary:   Genitourinary Comments: Uncircumcised penis, normal scrotum scrotal contents, 14 [place] Valentine catheter in place draining clear leonora urine  Musculoskeletal: Normal range of motion  He exhibits no edema  Neurological: He is alert and oriented to person, place, and time  Skin: Skin is warm and dry  No rash noted  He is not diaphoretic  No erythema  No pallor  Psychiatric: He has a normal mood and affect   His behavior is normal  Judgment and thought content normal  Nursing note and vitals reviewed  History:    Past Medical History:   Diagnosis Date    BPH (benign prostatic hyperplasia)     Essential tremor     Hyperlipidemia     Hypertension      Past Surgical History:   Procedure Laterality Date    HIP SURGERY      TONSILECTOMY AND ADNOIDECTOMY       Family History   Problem Relation Age of Onset    Heart disease Mother      Social History     Social History    Marital status: /Civil Union     Spouse name: N/A    Number of children: N/A    Years of education: N/A     Social History Main Topics    Smoking status: Never Smoker    Smokeless tobacco: Never Used    Alcohol use No    Drug use: No    Sexual activity: Not Asked     Other Topics Concern    None     Social History Narrative    None       Imaging: Outside renal ultrasound done, working to obtain records  Imaging reviewed - both report and images personally reviewed  Labs:  Recent Labs      10/04/18   0534   WBC  11 82*     Recent Labs      10/04/18   0534   HGB  9 7*       Recent Labs      10/03/18   1522  10/04/18   0534   CREATININE  2 36*  2 31*       Microbiology:  Urinalysis positive for leukocytes, otherwise negative      Carlos Alberto Chu PA-C  Date: 10/4/2018 Time: 9:14 AM

## 2018-10-04 NOTE — PLAN OF CARE

## 2018-10-04 NOTE — CASE MANAGEMENT
Initial Clinical Review    Admission: Date/Time/Statement: 10/3/18 @ 1747     Orders Placed This Encounter   Procedures    Inpatient Admission (expected length of stay for this patient is greater than two midnights)     Standing Status:   Standing     Number of Occurrences:   1     Order Specific Question:   Admitting Physician     Answer:   Jenny Crawford [[id number]]     Order Specific Question:   Level of Care     Answer:   Med Surg [[id number]]     Order Specific Question:   Estimated length of stay     Answer:   More than 2 Midnights     Order Specific Question:   Certification     Answer:   I certify that inpatient services are medically necessary for this patient for a duration of greater than two midnights  See H&P and MD Progress Notes for additional information about the patient's course of treatment  ED: Date/Time/Mode of Arrival:   ED Arrival Information     Expected Arrival Acuity Means of Arrival Escorted By Service Admission Type    10/3/2018  10/3/2018 12:57 Urgent Walk-In Spouse General Medicine Urgent    Arrival Complaint    catheter problem         Chief Complaint:   Chief Complaint   Patient presents with    Abdominal Pain     seen at [facility], had ultrasound done and was told to come to ED for evaluation of abdominal pain and urinary retention  States "my bladder is full and I need a catheter"  History of Illness: 81 yo male sent to the ED from outpatient ultrasound in [place] for evaluation of urinary retention, "distended bladder" seen on the ultrasound by verbal report from [facility]  He was getting the ultrasound to evaluate kidney and bladder based on blood work taken 9/4/18 by his PCP showing elevation of CRT and decreased GFR since last checked 12/2017  Today he was told to drink a quart of liquid and then not void until after the ultrasound  Apparently then he was unable to urinate which corresponded to full bladder by the ultrasound    Since transferring here, and then waiting, he was able to void and relieve some of the pressure  He denies pain, fever, chills, fever, chills, N/V, hematuria  While in the ER, a bladder scan was performed which revealed more than 900 mL of fluid within the bladder  A Valentine catheter was placed with brisk diuresis thereafter    ED Vital Signs:   ED Triage Vitals   Temperature Pulse Respirations Blood Pressure SpO2   10/03/18 1304 10/03/18 1304 10/03/18 1304 10/03/18 1304 10/03/18 1304   98 9 °F (37 2 °C) 82 18 (!) 182/78 98 %      Temp Source Heart Rate Source Patient Position - Orthostatic VS BP Location FiO2 (%)   10/03/18 1845 10/03/18 1304 10/03/18 1304 10/03/18 1514 --   Temporal Monitor Sitting Right arm       Pain Score       10/03/18 1304       5        Wt Readings from Last 1 Encounters:   10/03/18 99 8 kg (220 lb)       Vital Signs (abnormal):   /03/18 1742 -- 72 16  173/81 96 % -- MG   10/03/18 1514 -- 78 18 163/80 94 % -- MG   10/03/18 1304 98 9 °F (37 2 °C) 82 18  182/78 98 % 99 8 kg (220 lb)        Abnormal Labs/Diagnostic Test Results:   Cl 99  BUN 48,   Cr 2 36,   eGFR 25  Urine: large leukocytes, trace blood      ED Treatment:   Valentine placed  Medication Administration from 10/03/2018 1229 to 10/03/2018 1838     None          Past Medical/Surgical History:   Past Medical History:   Diagnosis Date    BPH (benign prostatic hyperplasia)     Essential tremor     Hyperlipidemia     Hypertension        Admitting Diagnosis: Renal failure [N19]  Urinary retention with incomplete bladder emptying [R33 9]  Val[facility] [T83  9XXA]    Age/Sex: 80 y o  male    Assessment/Plan:  81 yo male with:  Acute urinary retention   Assessment & Plan     Likely due to prostatic hypertrophy    Status post Valentine catheter placement   Start Flomax 0 4 mg daily, Proscar 5 mg daily  Consult Urology  Obtain outpatient KUB ultrasound result      Acute renal failure superimposed on stage 3 chronic kidney disease ([facility] [address] )   Assessment & Plan     Secondary to obstructive uropathy  Hold HCTZ  Start normal saline at 100 mL/hour  Continue Valentine catheter     Anticipated Length of Stay:  Patient will be admitted on an Inpatient basis with an anticipated length of stay of  at least 2 midnights  Justification for Hospital Stay:  Acute renal failure    Admission Orders: [facility] Urology  Reg Diet  CBC, BMP in am      Scheduled Meds:   Current Facility-Administered Medications:  aspirin 81 mg Oral Daily Erin Florence MD    atorvastatin 40 mg Oral Once per day on Mon Wed Fri Erin Florence MD    finasteride 5 mg Oral Daily Erin Florence MD    gabapentin 100 mg Oral BID Erin Florence MD    heparin (porcine) 5,000 Units Subcutaneous [facility] Erin Florence MD    propranolol 20 mg Oral After Kumar Morales MD    propranolol 60 mg Oral Daily Erin Florence MD    sodium chloride 125 mL/hr Intravenous Continuous Erin Florence MD Last Rate: 125 mL/hr (10/04/18 1128)   tamsulosin 0 4 mg Oral Daily With Kumar Morales MD      Continuous Infusions:   sodium chloride 125 mL/hr Last Rate: 125 mL/hr (10/04/18 1128)     PRN Meds:   10/4:  BUN 42,  Cr 2 31  H&H 9 7 / 29 4  Per Urology:  Acute renal failure superimposed on stage 3 chronic kidney disease ([facility] [address] )   Assessment & Plan     Creatinine has bumped to 2 3, above his reported baseline of 1  Likely post obstructive, given high volume retention  Plan:  Maintain Valentine to gravity       * Acute urinary retention   Assessment & Plan     Acute on chronic, given symptomatology pre-hospital   High volume retention with over 1 L on placement of Valentine catheter in the emergency department  Plan:  Given degree of retention and bladder distension, will require Valentine catheter decompression to gravity drainage for at least 7-10 days before removal and voiding trial as an outpatient  Discussed this with the patient  He is clear expectations that he will be discharged from the hospital with the Valentine catheter  Agree with Flomax and Proscar    Obtain outside renal ultrasound results  Thank you,  Rodolfo Soto  Utilization Review Department  Phone: [phone]; Fax [phone]  ATTENTION: Please call with any questions or concerns to [phone]  and carefully follow the prompts so that you are directed to the right person  Send all requests for admission clinical reviews, approved or denied determinations and any other requests to fax [phone]   All voicemails are confidential

## 2018-10-04 NOTE — ASSESSMENT & PLAN NOTE
Acute on chronic, given symptomatology pre-hospital   High volume retention with over 1 L on placement of Valentine catheter in the emergency department  Plan:  Given degree of retention and bladder distension, will require Valentine catheter decompression to gravity drainage for at least 7-10 days before removal and voiding trial as an outpatient  Discussed this with the patient  He is clear expectations that he will be discharged from the hospital with the Valentine catheter  Agree with Flomax and Proscar  Obtain outside renal ultrasound results

## 2018-10-04 NOTE — ASSESSMENT & PLAN NOTE
Secondary to obstructive uropathy  Hold HCTZ  Increased normal saline to 125 mL/hour  Creatinine still above the baseline  Continue Valentine catheter  Repeat BMP in [place]

## 2018-10-04 NOTE — PLAN OF CARE
DISCHARGE PLANNING     Discharge to home or other facility with appropriate resources Progressing        GENITOURINARY - ADULT     Maintains or returns to baseline urinary function Progressing     Absence of urinary retention Progressing     Urinary catheter remains patent Progressing        INFECTION - ADULT     Absence or prevention of progression during hospitalization Progressing     Absence of fever/infection during neutropenic period Progressing        Knowledge Deficit     Patient/family/caregiver demonstrates understanding of disease process, treatment plan, medications, and discharge instructions Progressing        METABOLIC, FLUID AND ELECTROLYTES - ADULT     Fluid balance maintained Progressing        PAIN - ADULT     Verbalizes/displays adequate comfort level or baseline comfort level Progressing        Potential for Falls     Patient will remain free of falls Progressing        SAFETY ADULT     Maintain or return to baseline ADL function Progressing     Maintain or return mobility status to optimal level Progressing

## 2018-10-05 ENCOUNTER — APPOINTMENT (INPATIENT)
Dept: ULTRASOUND IMAGING | Facility: HOSPITAL | Age: 83
DRG: 726 | End: 2018-10-05
Payer: MEDICARE

## 2018-10-05 LAB
ANION GAP SERPL CALCULATED.3IONS-SCNC: 10 MMOL/L (ref 4–13)
BUN SERPL-MCNC: 35 MG/DL (ref 5–25)
CALCIUM SERPL-MCNC: 8.5 MG/DL (ref 8.3–10.1)
CHLORIDE SERPL-SCNC: 105 MMOL/L (ref 100–108)
CO2 SERPL-SCNC: 27 MMOL/L (ref 21–32)
CREAT SERPL-MCNC: 1.83 MG/DL (ref 0.6–1.3)
GFR SERPL CREATININE-BSD FRML MDRD: 33 ML/MIN/1.73SQ M
GLUCOSE SERPL-MCNC: 92 MG/DL (ref 65–140)
POTASSIUM SERPL-SCNC: 3.6 MMOL/L (ref 3.5–5.3)
SODIUM SERPL-SCNC: 142 MMOL/L (ref 136–145)

## 2018-10-05 PROCEDURE — 97166 OT EVAL MOD COMPLEX 45 MIN: CPT

## 2018-10-05 PROCEDURE — 80048 BASIC METABOLIC PNL TOTAL CA: CPT | Performed by: INTERNAL MEDICINE

## 2018-10-05 PROCEDURE — G8988 SELF CARE GOAL STATUS: HCPCS

## 2018-10-05 PROCEDURE — 76770 US EXAM ABDO BACK WALL COMP: CPT

## 2018-10-05 PROCEDURE — 99232 SBSQ HOSP IP/OBS MODERATE 35: CPT | Performed by: INTERNAL MEDICINE

## 2018-10-05 PROCEDURE — G8987 SELF CARE CURRENT STATUS: HCPCS

## 2018-10-05 RX ADMIN — HEPARIN SODIUM 5000 UNITS: 5000 INJECTION INTRAVENOUS; SUBCUTANEOUS at 05:27

## 2018-10-05 RX ADMIN — SODIUM CHLORIDE 125 ML/HR: 0.9 INJECTION, SOLUTION INTRAVENOUS at 08:17

## 2018-10-05 RX ADMIN — FINASTERIDE 5 MG: 5 TABLET, FILM COATED ORAL at 08:16

## 2018-10-05 RX ADMIN — PROPRANOLOL HYDROCHLORIDE 20 MG: 20 TABLET ORAL at 17:01

## 2018-10-05 RX ADMIN — GABAPENTIN 100 MG: 100 CAPSULE ORAL at 08:16

## 2018-10-05 RX ADMIN — HEPARIN SODIUM 5000 UNITS: 5000 INJECTION INTRAVENOUS; SUBCUTANEOUS at 21:11

## 2018-10-05 RX ADMIN — ASPIRIN 81 MG: 81 TABLET, COATED ORAL at 08:16

## 2018-10-05 RX ADMIN — GABAPENTIN 100 MG: 100 CAPSULE ORAL at 17:01

## 2018-10-05 RX ADMIN — PROPRANOLOL HYDROCHLORIDE 60 MG: 20 TABLET ORAL at 08:15

## 2018-10-05 RX ADMIN — ATORVASTATIN CALCIUM 40 MG: 40 TABLET, FILM COATED ORAL at 08:16

## 2018-10-05 RX ADMIN — SODIUM CHLORIDE 125 ML/HR: 0.9 INJECTION, SOLUTION INTRAVENOUS at 16:25

## 2018-10-05 RX ADMIN — HEPARIN SODIUM 5000 UNITS: 5000 INJECTION INTRAVENOUS; SUBCUTANEOUS at 13:43

## 2018-10-05 RX ADMIN — TAMSULOSIN HYDROCHLORIDE 0.4 MG: 0.4 CAPSULE ORAL at 17:01

## 2018-10-05 NOTE — PLAN OF CARE
Problem: OCCUPATIONAL THERAPY ADULT  Goal: Performs self-care activities at highest level of function for planned discharge setting  See evaluation for individualized goals  Treatment Interventions: Cognitive reorientation, Patient/family training, Compensatory technique education, Continued evaluation, ADL retraining, Functional transfer training, Endurance training, Activityengagement          See flowsheet documentation for full assessment, interventions and recommendations  Limitation: Decreased cognition, Decreased Safe judgement during ADL, Visual deficit, Decreased ADL status, Decreased UE strength, Decreased endurance, Decreased high-level ADLs  Prognosis: Good  Assessment: Pt is a 80 y o male admitted to the hospital after following an outpt ultrasound of his bladder 2* signs of abdominal distension  Pt noted with acute urinary retention 2* likely prostatic hypertrophy  Pt has a history of neck/head essential tremors  PTA pt states independence in all ADLs, transfers, ambulation--with [facility]; assistance with shower transfers; - home alone,+driving  During inital eval pt demonstrated deficits in cognition(ie  memory and problem solving), functional balance, ADL status, and transfer safety  At end of session pt was left with chair alarm on and call bell orientation provided  Pt would benefit from continued OT tx for above deficits 3-5wk/1-2wks  OT Discharge Recommendation: Home OT (home with therapy services--i  e PT/OT)

## 2018-10-05 NOTE — ASSESSMENT & PLAN NOTE
Secondary to obstructive uropathy  Hold HCTZ  Continue normal saline to 125 mL/hour  Creatinine still above the baseline  Continue Valentine catheter  Repeat BMP in [place]

## 2018-10-05 NOTE — PLAN OF CARE
Problem: OCCUPATIONAL THERAPY ADULT  Goal: Performs self-care activities at highest level of function for planned discharge setting  See evaluation for individualized goals  Treatment Interventions: Cognitive reorientation, Patient/family training, Compensatory technique education, Continued evaluation        See flowsheet documentation for full assessment, interventions and recommendations  Limitation: Decreased cognition, Decreased Safe judgement during ADL, Visual deficit  Prognosis: Good  Assessment: Pt is a 80 y o male admitted to the hospital following ultrasound of bladder signs of distension 2* acute urinary retention due to prostatic hyperplasia  Pt has a history of neck/head tremors  Pt states independence in all ADLs, iADLs, and functional mobility with help from wife;- home alone,+driving  During inital eval pt demonstrated deficits in cognition(ie  memory and problem solving), balance, and transfer safety  At end of session pt was left with chair alarm on and call bell orientation provided  Pt would benefit from continued OT tx for above deficits 3-5wk/1-2wks         OT Discharge Recommendation: Home OT

## 2018-10-05 NOTE — OCCUPATIONAL THERAPY NOTE
OccupationalTherapy Evaluation(time=0925-0955)      Patient Name: Janny Dawson  Today's Date: 10/5/2018  Problem List  Patient Active Problem List   Diagnosis    Acute urinary retention    Acute renal failure superimposed on stage 3 chronic kidney disease (HCC)    Essential tremor    Other hyperlipidemia     Past Medical History  Past Medical History:   Diagnosis Date    BPH (benign prostatic hyperplasia)     Essential tremor     Hyperlipidemia     Hypertension      Past Surgical History  Past Surgical History:   Procedure Laterality Date    HIP SURGERY      TONSILECTOMY AND ADNOIDECTOMY           10/05/18 0956   Note Type   Note type Eval only   Restrictions/Precautions   Weight Bearing Precautions Per Order No   Other Precautions Chair Alarm; Fall Risk   Pain Assessment   Pain Assessment FLACC   Pain Rating: FLACC (Rest) - Face 0   Pain Rating: FLACC (Rest) - Legs 0   Pain Rating: FLACC (Rest) - Activity 0   Pain Rating: FLACC (Rest) - Cry 0   Pain Rating: FLACC (Rest) - Consolability 0   Score: FLACC (Rest) 0   Home Living   Type of Home House   Home Layout Two level  (bedroom on 2nd floor, flight of steps )   Home Equipment Walker;Cane   Prior Function   Level of [place] Independent with ADLs and functional mobility   Lives With Spouse   Receives Help From Family   Lifestyle   Autonomy PTA pt states independence in all ADLs, transfers, ambulation--with [facility];- home alone,+driving    Reciprocal Relationships 4 children   Service to Others Retired microbiologist    Intrinsic Gratification bowling, exercise class   Psychosocial   Psychosocial (WDL) WDL   Patient Behaviors/Mood Pleasant   Subjective   Subjective "I have to go to the bathroom"   ADL   Where Assessed Edge of bed   Eating Assistance 5  Supervision/Setup   Grooming Assistance 5  Supervision/Setup   UB Bathing Assistance 5  Supervision/Setup   LB Bathing Assistance 4  Minimal Assistance   UB Dressing Assistance 5  Supervision/Setup   LB Dressing Assistance 4  Minimal Assistance   Toileting Assistance  4  Minimal Assistance  (x1)   Toileting Deficit Verbal cueing; Increased time to complete;Grab bar use   Transfers   Sit to Stand 4  Minimal assistance   Additional items Assist x 1;Verbal cues; Increased time required   Stand to Sit 4  Minimal assistance   Additional items Assist x 1;Verbal cues; Increased time required   Toilet transfer 4  Minimal assistance  (x1)   Functional Mobility   Functional Mobility 4  Minimal assistance   Additional Comments x1   Additional items Rolling walker   Balance   Static Sitting Good   Dynamic Sitting Fair +   Static Standing Fair   Dynamic Standing Fair -   Activity Tolerance   Activity Tolerance Patient tolerated treatment well   Medical Staff Made Aware nsg   RUE Assessment   RUE Assessment WFL   RUE Strength   RUE Overall Strength Within Functional Limits - able to perform ADL tasks with strength  (4+/5 throughout)   LUE Assessment   LUE Assessment WFL   LUE Strength   LUE Overall Strength Within Functional Limits - able to perform ADL tasks with strength  (4+/5 throughout)   Hand Function   Gross Motor Coordination Functional   Fine Motor Coordination Functional   Sensation   Light Touch No apparent deficits   Proprioception   Proprioception No apparent deficits   Vision-Basic Assessment   Current Vision Wears glasses all the time   Vision - Complex Assessment   Acuity (impaired)   Perception   Inattention/Neglect Appears intact   Cognition   Overall Cognitive Status Impaired   Arousal/Participation Cooperative; Alert   Attention Attends with cues to redirect   Orientation Level Oriented to person;Oriented to place   Memory Decreased short term memory   Following Commands Follows one step commands with increased time or repetition   Comments wife states 2 wks hx decreased cognition   Assessment   Limitation Decreased cognition;Decreased Safe judgement during ADL;Visual deficit; Decreased ADL status; Decreased UE strength;Decreased endurance;Decreased high-level ADLs   Prognosis Good   Assessment Pt is a 80 y o male admitted to the hospital after following an outpt ultrasound of his bladder 2* signs of abdominal distension  Pt noted with acute urinary retention 2* likely prostatic hypertrophy  Pt has a history of neck/head essential tremors  PTA pt states independence in all ADLs, transfers, ambulation--with [employer]; assistance with shower transfers; - home alone,+driving  During inital eval pt demonstrated deficits in cognition(ie  memory and problem solving), functional balance, ADL status, and transfer safety  At end of session pt was left with chair alarm on and call bell orientation provided  Pt would benefit from continued OT tx for above deficits 3-5wk/1-2wks  Goals   Patient Goals "to go home"   STG Time Frame 3-5   Short Term Goal #1 Pt will tolerate continued cognitive assessment and appropriate d/c recommendations will be provided  Short Term Goal #2 Pt will demonstrate independence with walker mobility for home/community activities 100% of the time  Short Term Goal  Pt will demonstrate mod I with his sit-stand transfers to assist with completion of his LE ADLs  LTG Time Frame (5-10)   Long Term Goal #1 Pt will demonstrate proper transfer safety 100% of the time  Long Term Goal #2 Pt will demonstrate g/g- balance with all functional tasks  Long Term Goal Pt will demonstrate Gabrielle with his UE and LE bathing/dressing  Plan   Treatment Interventions Cognitive reorientation;Patient/family training; Compensatory technique education;Continued evaluation; ADL retraining;Functional transfer training; Endurance training; Activityengagement   Goal Expiration Date 10/15/18   Treatment Day 0   OT Frequency 3-5x/wk   Recommendation   OT Discharge Recommendation Home OT  (home with therapy services--i  e PT/OT)   Barthel Index   Feeding 10   Bathing 0   Grooming Score 5   Dressing Score 5   Bladder Score 0   Bowels Score 10   Toilet Use Score 10   Transfers (Bed/Chair) Score 10   Mobility (Level Surface) Score 0   Stairs Score 0   Barthel Index Score 50   Modified Karey Scale   Modified Karey Scale 4   Malia Salinas, OT

## 2018-10-05 NOTE — PROGRESS NOTES
Progress Note - Attila Liz 1935, 80 y o  male MRN: [id number]    Unit/Bed#: [facility] [address] 224-02 Encounter: [id number]    Primary Care Provider: Crystal Godfrey MD   Date and time admitted to hospital: 10/3/2018  2:15 PM        * Acute urinary retention   Assessment & Plan    Likely due to prostatic hypertrophy  Status post Valentine catheter placement   Continue Flomax 0 4 mg daily, Proscar 5 mg daily  Per Urology, maintain Valentine catheter for 7-10 days before removal  Try to obtain outpatient KUB ultrasound result     Acute renal failure superimposed on stage 3 chronic kidney disease ([facility] [address] )   Assessment & Plan    Secondary to obstructive uropathy  Hold HCTZ  Continue normal saline to 125 mL/hour  Creatinine still above the baseline  Continue Valentine catheter  Repeat BMP in a m  Other hyperlipidemia   Assessment & Plan    Continue Lipitor every      Essential tremor   Assessment & Plan    Continue propranolol 60 mg in the morning and 20 mg in the evening         VTE Pharmacologic Prophylaxis:   Pharmacologic: Heparin  Mechanical VTE Prophylaxis in Place: No    Education and Discussions with Family / Patient:  Spoke with wife    Time Spent for Care: 20 minutes  More than 50% of total time spent on counseling and coordination of care as described above  Current Length of Stay: 2 day(s)    Current Patient Status: Inpatient   Certification Statement: The patient will continue to require additional inpatient hospital stay due to Acute kidney injury    Discharge Plan:  In 24 hr    Code Status: Level 1 - Full Code      Subjective:   No fever or chills  No events overnight  Chronic tremors    Objective:     Vitals:   Temp (24hrs), Av 4 °F (36 3 °C), Min:97 2 °F (36 2 °C), Max:97 7 °F (36 5 °C)    HR:  [66-95] 83  Resp:  [17-18] 17  BP: (100-171)/(63-94) 171/94  SpO2:  [93 %-98 %] 94 %  There is no height or weight on file to calculate BMI       Input and Output Summary (last 24 hours): Intake/Output Summary (Last 24 hours) at 10/05/18 1030  Last data filed at 10/05/18 0817   Gross per 24 hour   Intake          4312 08 ml   Output             4050 ml   Net           262 08 ml       Physical Exam:     Physical Exam   Constitutional: He is oriented to person, place, and time  He appears well-developed and well-nourished  HENT:   Head: Normocephalic and atraumatic  Eyes: No scleral icterus  Neck: No JVD present  Cardiovascular: Regular rhythm  Exam reveals no friction rub  No murmur heard  Pulmonary/Chest: Effort normal  No respiratory distress  He has no wheezes  Abdominal: Soft  He exhibits no distension  There is no tenderness  Genitourinary:   Genitourinary Comments: Valentine catheter with clear urine output   Musculoskeletal: He exhibits no edema  Neurological: He is alert and oriented to person, place, and time  Tremors of the head and neck   Skin: Skin is warm and dry  Psychiatric: He has a normal mood and affect  Additional Data:     Labs:      Results from last 7 days  Lab Units 10/04/18  0534   WBC Thousand/uL 11 82*   HEMOGLOBIN g/dL 9 7*   HEMATOCRIT % 29 4*   PLATELETS Thousands/uL 286   NEUTROS PCT % 70   LYMPHS PCT % 22   MONOS PCT % 7   EOS PCT % 1       Results from last 7 days  Lab Units 10/05/18  0527   SODIUM mmol/L 142   POTASSIUM mmol/L 3 6   CHLORIDE mmol/L 105   CO2 mmol/L 27   BUN mg/dL 35*   CREATININE mg/dL 1 83*   ANION GAP mmol/L 10   CALCIUM mg/dL 8 5                           * I Have Reviewed All Lab Data Listed Above  * Additional Pertinent Lab Tests Reviewed:  All Labs Within Last 24 Hours Reviewed    Imaging:    Imaging Reports Reviewed Today Include:  No new imaging      Recent Cultures (last 7 days):           Last 24 Hours Medication List:     Current Facility-Administered Medications:  aspirin 81 mg Oral Daily Ernesto Siddiqui MD    atorvastatin 40 mg Oral Once per day on Mon Wed Fri Ernesto Siddiqui MD    finasteride 5 mg Oral Daily Andrew Blackmon MD    gabapentin 100 mg Oral BID Andrew Blackmon MD    heparin (porcine) 5,000 Units Subcutaneous [facility] Andrew Blackmon MD    propranolol 20 mg Oral After Jaida Vinson MD    propranolol 60 mg Oral Daily Andrew Blackmon MD    sodium chloride 125 mL/hr Intravenous Continuous Andrew Blackmon MD Last Rate: 125 mL/hr (10/05/18 9130)   tamsulosin 0 4 mg Oral Daily With Jaida Vinson MD         Today, Patient Was Seen By: Andrew Blackmon MD    ** Please Note: Dictation voice to text software may have been used in the creation of this document   **

## 2018-10-05 NOTE — PLAN OF CARE
DISCHARGE PLANNING     Discharge to home or other facility with appropriate resources Progressing        DISCHARGE PLANNING - CARE MANAGEMENT     Discharge to post-acute care or home with appropriate resources Progressing        GENITOURINARY - ADULT     Maintains or returns to baseline urinary function Progressing     Absence of urinary retention Progressing     Urinary catheter remains patent Progressing        INFECTION - ADULT     Absence or prevention of progression during hospitalization Progressing     Absence of fever/infection during neutropenic period Progressing        Knowledge Deficit     Patient/family/caregiver demonstrates understanding of disease process, treatment plan, medications, and discharge instructions Progressing        METABOLIC, FLUID AND ELECTROLYTES - ADULT     Fluid balance maintained Progressing        PAIN - ADULT     Verbalizes/displays adequate comfort level or baseline comfort level Progressing        Potential for Falls     Patient will remain free of falls Progressing        SAFETY ADULT     Maintain or return to baseline ADL function Progressing     Maintain or return mobility status to optimal level Progressing

## 2018-10-05 NOTE — ASSESSMENT & PLAN NOTE
Likely due to prostatic hypertrophy    Status post Valentine catheter placement   Continue Flomax 0 4 mg daily, Proscar 5 mg daily  Per Urology, maintain Valentine catheter for 7-10 days before removal  Try to obtain outpatient KUB ultrasound result

## 2018-10-06 ENCOUNTER — APPOINTMENT (INPATIENT)
Dept: CT IMAGING | Facility: HOSPITAL | Age: 83
DRG: 726 | End: 2018-10-06
Payer: MEDICARE

## 2018-10-06 LAB
ANION GAP SERPL CALCULATED.3IONS-SCNC: 9 MMOL/L (ref 4–13)
BUN SERPL-MCNC: 31 MG/DL (ref 5–25)
CALCIUM SERPL-MCNC: 8.5 MG/DL (ref 8.3–10.1)
CHLORIDE SERPL-SCNC: 107 MMOL/L (ref 100–108)
CO2 SERPL-SCNC: 25 MMOL/L (ref 21–32)
CREAT SERPL-MCNC: 1.77 MG/DL (ref 0.6–1.3)
GFR SERPL CREATININE-BSD FRML MDRD: 35 ML/MIN/1.73SQ M
GLUCOSE SERPL-MCNC: 95 MG/DL (ref 65–140)
POTASSIUM SERPL-SCNC: 3.3 MMOL/L (ref 3.5–5.3)
SODIUM SERPL-SCNC: 141 MMOL/L (ref 136–145)

## 2018-10-06 PROCEDURE — 80048 BASIC METABOLIC PNL TOTAL CA: CPT | Performed by: INTERNAL MEDICINE

## 2018-10-06 PROCEDURE — 99232 SBSQ HOSP IP/OBS MODERATE 35: CPT | Performed by: INTERNAL MEDICINE

## 2018-10-06 PROCEDURE — 99233 SBSQ HOSP IP/OBS HIGH 50: CPT | Performed by: UROLOGY

## 2018-10-06 PROCEDURE — 74176 CT ABD & PELVIS W/O CONTRAST: CPT

## 2018-10-06 RX ADMIN — FINASTERIDE 5 MG: 5 TABLET, FILM COATED ORAL at 08:46

## 2018-10-06 RX ADMIN — HEPARIN SODIUM 5000 UNITS: 5000 INJECTION INTRAVENOUS; SUBCUTANEOUS at 21:14

## 2018-10-06 RX ADMIN — SODIUM CHLORIDE 125 ML/HR: 0.9 INJECTION, SOLUTION INTRAVENOUS at 18:10

## 2018-10-06 RX ADMIN — HEPARIN SODIUM 5000 UNITS: 5000 INJECTION INTRAVENOUS; SUBCUTANEOUS at 14:18

## 2018-10-06 RX ADMIN — GABAPENTIN 100 MG: 100 CAPSULE ORAL at 08:47

## 2018-10-06 RX ADMIN — ASPIRIN 81 MG: 81 TABLET, COATED ORAL at 08:46

## 2018-10-06 RX ADMIN — PROPRANOLOL HYDROCHLORIDE 20 MG: 20 TABLET ORAL at 17:32

## 2018-10-06 RX ADMIN — TAMSULOSIN HYDROCHLORIDE 0.4 MG: 0.4 CAPSULE ORAL at 16:31

## 2018-10-06 RX ADMIN — GABAPENTIN 100 MG: 100 CAPSULE ORAL at 17:31

## 2018-10-06 RX ADMIN — SODIUM CHLORIDE 125 ML/HR: 0.9 INJECTION, SOLUTION INTRAVENOUS at 09:32

## 2018-10-06 RX ADMIN — HEPARIN SODIUM 5000 UNITS: 5000 INJECTION INTRAVENOUS; SUBCUTANEOUS at 05:16

## 2018-10-06 RX ADMIN — PROPRANOLOL HYDROCHLORIDE 60 MG: 20 TABLET ORAL at 08:46

## 2018-10-06 RX ADMIN — SODIUM CHLORIDE 125 ML/HR: 0.9 INJECTION, SOLUTION INTRAVENOUS at 00:45

## 2018-10-06 NOTE — PROGRESS NOTES
I was asked to see the patient once again, regarding his bilateral hydronephrosis and acute/chronic kidney injury  Previously this admission, he was found to be in severe urinary retention  Valentine catheter was placed and he has diuresed well  Renal function has improved somewhat  Creatinine down to 1 77 from 2 36  Ultrasound yesterday revealed bilateral hydronephrosis down to the level of the bladder  Bladder wall thickening also identified  CT scan was recommended and was just performed  This revealed significant bilateral hydroureteronephrosis without actual obstruction  Bladder wall seems to be circumferentially thickened  Hydronephrosis may be related to severe bladder wall thickening and obstruction, although he is making good urine  Alternatively, hydronephrosis may be related to chronic reflux due to chronic outlet obstruction/neurogenic bladder  I discussed the finding with the family and patient  At this point I would observe at least another day to ensure that renal function continues to improve  If he seems to plateau, would then need to consider retrograde pyelography, possible stent placement in the operating room

## 2018-10-06 NOTE — PROGRESS NOTES
Progress Note - Chidi Bains 1935, 80 y o  male MRN: [id number]    [facility] [address] 224-02 Encounter: [id number]    Primary Care Provider: Raheel Torrez MD   Date and time admitted to hospital: 10/3/2018  2:15 PM        * Acute urinary retention   Assessment & Plan    With persistent hydronephrosis and bladder distention status post Valentine catheter  Discussed findings with urology, Dr Fields Huge   Await further recommendation after a examines him today  Continue Flomax 0 4 mg daily, Proscar 5 mg daily  Maintain Valentine catheter     Acute renal failure superimposed on stage 3 chronic kidney disease ([facility] [address] )   Assessment & Plan    Secondary to obstructive uropathy  Hold HCTZ  Continue normal saline to 125 mL/hour  Creatinine still above the baseline  Continue Valentine catheter  Repeat BMP in a m  Other hyperlipidemia   Assessment & Plan    Continue Lipitor every      Essential tremor   Assessment & Plan    Continue propranolol 60 mg in the morning and 20 mg in the evening         VTE Pharmacologic Prophylaxis:   Pharmacologic: Heparin  Mechanical VTE Prophylaxis in Place: No    Discussions with Specialists or Other Care Team Provider:  Spoke with Urology    Time Spent for Care: 20 minutes  More than 50% of total time spent on counseling and coordination of care as described above  Current Length of Stay: 3 day(s)    Current Patient Status: Inpatient   Certification Statement: The patient will continue to require additional inpatient hospital stay due to Persistent hydronephrosis    Discharge Plan:  Cancel discharge today    Code Status: Level 1 - Full Code      Subjective:   No fever or chills  No hematuria  Objective:     Vitals:   Temp (24hrs), Av 5 °F (36 4 °C), Min:97 1 °F (36 2 °C), Max:97 7 °F (36 5 °C)    HR:  [62-71] 69  Resp:  [18-20] 20  BP: (131-165)/(61-98) 165/98  SpO2:  [94 %-97 %] 95 %  There is no height or weight on file to calculate BMI       Input and Output Summary (last 24 hours): Intake/Output Summary (Last 24 hours) at 10/06/18 1104  Last data filed at 10/06/18 0932   Gross per 24 hour   Intake          3734 58 ml   Output             3225 ml   Net           509 58 ml       Physical Exam:     Physical Exam   Constitutional: He appears well-developed and well-nourished  HENT:   Head: Normocephalic and atraumatic  Eyes: No scleral icterus  Neck: No JVD present  Cardiovascular: Regular rhythm  No murmur heard  Pulmonary/Chest: Effort normal  No respiratory distress  He has no wheezes  He has no rales  Abdominal: Soft  He exhibits no distension  There is no tenderness  Genitourinary:   Genitourinary Comments: Clear urine output per Valentine   Musculoskeletal: He exhibits no edema  Neurological: He is alert  Tremors   Skin: Skin is warm and dry  Psychiatric: He has a normal mood and affect  Additional Data:     Labs:      Results from last 7 days  Lab Units 10/04/18  0534   WBC Thousand/uL 11 82*   HEMOGLOBIN g/dL 9 7*   HEMATOCRIT % 29 4*   PLATELETS Thousands/uL 286   NEUTROS PCT % 70   LYMPHS PCT % 22   MONOS PCT % 7   EOS PCT % 1       Results from last 7 days  Lab Units 10/06/18  0437   SODIUM mmol/L 141   POTASSIUM mmol/L 3 3*   CHLORIDE mmol/L 107   CO2 mmol/L 25   BUN mg/dL 31*   CREATININE mg/dL 1 77*   ANION GAP mmol/L 9   CALCIUM mg/dL 8 5                           * I Have Reviewed All Lab Data Listed Above  * Additional Pertinent Lab Tests Reviewed:  All Labs Within Last 24 Hours Reviewed    Imaging:    Imaging Reports Reviewed Today Include:  Ultrasound    Recent Cultures (last 7 days):           Last 24 Hours Medication List:     Current Facility-Administered Medications:  aspirin 81 mg Oral Daily Monae Eastman MD    atorvastatin 40 mg Oral Once per day on Mon Wed Fri Monae Eastman MD    finasteride 5 mg Oral Daily Monae Eastman MD    gabapentin 100 mg Oral BID Monae Eastman MD    heparin (porcine) 5,000 Units Subcutaneous [address] Sebastian Ruano MD    propranolol 20 mg Oral After Jacqui Vasquez MD    propranolol 60 mg Oral Daily Sebastian Ruano MD    sodium chloride 125 mL/hr Intravenous Continuous Sebastian Ruano MD Last Rate: 125 mL/hr (10/06/18 0932)   tamsulosin 0 4 mg Oral Daily With Jacqui Vasquez MD         Today, Patient Was Seen By: Sebastian Ruano MD    ** Please Note: Dictation voice to text software may have been used in the creation of this document   **

## 2018-10-06 NOTE — ASSESSMENT & PLAN NOTE
With persistent hydronephrosis and bladder distention status post Valentine catheter  Discussed findings with urology, Dr Malik Hicks   Await further recommendation after a examines him today    Continue Flomax 0 4 mg daily, Proscar 5 mg daily  Maintain Valentine catheter

## 2018-10-07 VITALS
RESPIRATION RATE: 18 BRPM | OXYGEN SATURATION: 98 % | HEART RATE: 79 BPM | WEIGHT: 220 LBS | DIASTOLIC BLOOD PRESSURE: 92 MMHG | SYSTOLIC BLOOD PRESSURE: 145 MMHG | TEMPERATURE: 97.3 F

## 2018-10-07 PROBLEM — N13.39 OTHER HYDRONEPHROSIS: Status: ACTIVE | Noted: 2018-10-07

## 2018-10-07 LAB
ANION GAP SERPL CALCULATED.3IONS-SCNC: 8 MMOL/L (ref 4–13)
BUN SERPL-MCNC: 24 MG/DL (ref 5–25)
CALCIUM SERPL-MCNC: 8.5 MG/DL (ref 8.3–10.1)
CHLORIDE SERPL-SCNC: 107 MMOL/L (ref 100–108)
CO2 SERPL-SCNC: 26 MMOL/L (ref 21–32)
CREAT SERPL-MCNC: 1.49 MG/DL (ref 0.6–1.3)
GFR SERPL CREATININE-BSD FRML MDRD: 43 ML/MIN/1.73SQ M
GLUCOSE SERPL-MCNC: 85 MG/DL (ref 65–140)
POTASSIUM SERPL-SCNC: 3.4 MMOL/L (ref 3.5–5.3)
SODIUM SERPL-SCNC: 141 MMOL/L (ref 136–145)

## 2018-10-07 PROCEDURE — 99239 HOSP IP/OBS DSCHRG MGMT >30: CPT | Performed by: INTERNAL MEDICINE

## 2018-10-07 PROCEDURE — 97163 PT EVAL HIGH COMPLEX 45 MIN: CPT

## 2018-10-07 PROCEDURE — G8978 MOBILITY CURRENT STATUS: HCPCS

## 2018-10-07 PROCEDURE — 80048 BASIC METABOLIC PNL TOTAL CA: CPT | Performed by: INTERNAL MEDICINE

## 2018-10-07 PROCEDURE — G8979 MOBILITY GOAL STATUS: HCPCS

## 2018-10-07 RX ORDER — TAMSULOSIN HYDROCHLORIDE 0.4 MG/1
0.4 CAPSULE ORAL
Qty: 30 CAPSULE | Refills: 0 | Status: SHIPPED | OUTPATIENT
Start: 2018-10-07 | End: 2018-11-02 | Stop reason: SDUPTHER

## 2018-10-07 RX ORDER — FINASTERIDE 5 MG/1
5 TABLET, FILM COATED ORAL DAILY
Qty: 30 TABLET | Refills: 0 | Status: SHIPPED | OUTPATIENT
Start: 2018-10-08 | End: 2018-11-02 | Stop reason: SDUPTHER

## 2018-10-07 RX ADMIN — GABAPENTIN 100 MG: 100 CAPSULE ORAL at 08:13

## 2018-10-07 RX ADMIN — ASPIRIN 81 MG: 81 TABLET, COATED ORAL at 08:12

## 2018-10-07 RX ADMIN — SODIUM CHLORIDE 125 ML/HR: 0.9 INJECTION, SOLUTION INTRAVENOUS at 01:45

## 2018-10-07 RX ADMIN — SODIUM CHLORIDE 125 ML/HR: 0.9 INJECTION, SOLUTION INTRAVENOUS at 09:53

## 2018-10-07 RX ADMIN — PROPRANOLOL HYDROCHLORIDE 60 MG: 20 TABLET ORAL at 08:13

## 2018-10-07 RX ADMIN — FINASTERIDE 5 MG: 5 TABLET, FILM COATED ORAL at 08:13

## 2018-10-07 RX ADMIN — HEPARIN SODIUM 5000 UNITS: 5000 INJECTION INTRAVENOUS; SUBCUTANEOUS at 05:34

## 2018-10-07 NOTE — ASSESSMENT & PLAN NOTE
Secondary to prostatomegaly with bladder outlet obstruction versus bilateral vesicoureteral reflux  Creatinine improved back to baseline  Discussed with Dr Coco Gerardo   No plan for immediate cystoscopy  Continue Valentine catheter, follow up in the office with eventual elective cystoscopy

## 2018-10-07 NOTE — ASSESSMENT & PLAN NOTE
With persistent hydronephrosis and bladder distention status post Valentine catheter  Repeat CT showed bladder distention and hydronephrosis without obstruction  Creatinine improved back to baseline  Discussed findings with urology, Dr Alexandra Buerger is for outpatient follow-up an elective cystoscopy    Will DC home with Valentine catheter and VNA

## 2018-10-07 NOTE — DISCHARGE SUMMARY
Discharge- Haylie Edward 1935, 80 y o  male MRN: [id number]    Unit/Bed#: [address] 224-02 Encounter: [id number]    Primary Care Provider: Rylan Alvarez MD   Date and time admitted to hospital: 10/3/2018  2:15 PM        * Acute urinary retention   Assessment & Plan    With persistent hydronephrosis and bladder distention status post Valentine catheter  Repeat CT showed bladder distention and hydronephrosis without obstruction  Creatinine improved back to baseline  Discussed findings with urology, Dr John Mary is for outpatient follow-up an elective cystoscopy  Will DC home with Valentine catheter and VNA       Other hydronephrosis   Assessment & Plan    Secondary to prostatomegaly with bladder outlet obstruction versus bilateral vesicoureteral reflux  Creatinine improved back to baseline  Discussed with Dr Sharyn Gudino   No plan for immediate cystoscopy  Continue Valentine catheter, follow up in the office with eventual elective cystoscopy         Acute renal failure superimposed on stage 3 chronic kidney disease ([address] )   Assessment & Plan    Secondary to obstructive uropathy  Acute renal failure resolved  Discharge home with Valentine catheter       Other hyperlipidemia   Assessment & Plan    Continue Lipitor every Monday Wednesday Friday     Essential tremor   Assessment & Plan    Continue propranolol 60 mg in the morning and 20 mg in the evening           Discharging Physician / Practitioner: Zaid Mayen MD  PCP: Rylan Alvarez MD  Admission Date:   Admission Orders     Ordered        10/03/18 1747  Inpatient Admission (expected length of stay for this patient is greater than two midnights)  Once             Discharge Date: 10/07/18    Resolved Problems  Date Reviewed: 10/7/2018    None          Consultations During Hospital Stay:  · Urology    Procedures Performed:     · Valentine catheter placement    Significant Findings / Test Results:     · KUB ultrasound-moderate bilateral hydronephrosis, likely secondary to obstruction at the ureterovesical junction  Markedly thickened bladder wall and increased echogenicity in lumen  · CT abdomen and pelvis-significant bilateral hydronephrosis with hydroureter extending to the level of the bladder  This may be related to superior bladder wall thickening or bladder outlet obstruction  Bilateral reflux is also a consideration  Incidental Findings:   · Prostatomegaly     Test Results Pending at Discharge (will require follow up): · None     Outpatient Tests Requested:  · Outpatient Urology follow-up for voiding trial and further testing    Complications:  None    Reason for Admission:  Abnormal outpatient ultrasound    Hospital Course: Yanci Zuniga is a 80 y o  male patient who originally presented to the hospital on 10/3/2018 due to abnormal outpatient ultrasound  The patient was sent to undergo an outpatient kidney ureter and bladder ultrasound due to rising creatinine  He was called by the facility and was told to come to the hospital due to abnormal results  He was found to have bilateral hydronephrosis and dilated urinary bladder  In the ER a Valentine catheter was placed and the patient immediately urinated approximately 2 L  He was admitted for acute urinary retention and acute kidney injury  His acute kidney injury was secondary to obstructive uropathy on top of hydrochlorothiazide use  His creatinine improved down to baseline after the Valentine catheter was placed and a few days of IV fluid hydration  He will remain off hydrochlorothiazide after discharge    He was seen by Urology regarding his hydronephrosis and obstructive uropathy  Repeat ultrasound done in the hospital showed bladder distention and hydronephrosis with thickening of the bladder wall  Follow-up CT confirmed the same findings  Clinically the patient did not have an infection  His urine remained clear without hematuria    Discussed findings with Dr Nhung Rivera who recommended keeping the Valentine catheter and outpatient follow-up for elective cystoscopy  Findings and plan were discussed with the patient, his wife, and his daughter at the bedside  He will be discharged with a Valentine catheter, leg bag and VNA      An ambulatory referral to Urology was placed on the discharge orders  Please see above list of diagnoses and related plan for additional information  Condition at Discharge: good     Discharge Day Visit / Exam:     Subjective:  No fever or chills  No pain from the Valentine  Rajesh Christian to go home  Vitals: Blood Pressure: 145/92 (10/07/18 1332)  Pulse: 79 (10/07/18 1332)  Temperature: (!) 97 3 °F (36 3 °C) (10/07/18 0724)  Temp Source: Temporal (10/07/18 0724)  Respirations: 18 (10/07/18 1332)  Weight - Scale: 99 8 kg (220 lb) (10/03/18 1304)  SpO2: 98 % (10/07/18 0724)  Exam:   Physical Exam   Constitutional: He appears well-developed  No distress  HENT:   Head: Normocephalic and atraumatic  Mouth/Throat: No oropharyngeal exudate  Eyes: No scleral icterus  Neck: No JVD present  Cardiovascular: Regular rhythm  Murmur heard  Pulmonary/Chest: Effort normal  No respiratory distress  He has no wheezes  Abdominal: Soft  He exhibits no distension  Genitourinary:   Genitourinary Comments: Valentine catheter with clear yellow output and  sediments   Musculoskeletal: He exhibits edema  Neurological: He is alert  Skin: Skin is warm and dry  He is not diaphoretic  Psychiatric: He has a normal mood and affect  Discussion with Family:  Yes    Discharge instructions/Information to patient and family:   See after visit summary for information provided to patient and family  Provisions for Follow-Up Care:  See after visit summary for information related to follow-up care and any pertinent home health orders        Disposition:     Home with VNA Services (Reminder: Complete face to face encounter)      Planned Readmission: no     Discharge Statement:  I spent >30 minutes discharging the patient  This time was spent on the day of discharge  I had direct contact with the patient on the day of discharge  Greater than 50% of the total time was spent examining patient, answering all patient questions, arranging and discussing plan of care with patient as well as directly providing post-discharge instructions  Additional time then spent on discharge activities  Discharge Medications:  See after visit summary for reconciled discharge medications provided to patient and family        ** Please Note: This note has been constructed using a voice recognition system **

## 2018-10-07 NOTE — ASSESSMENT & PLAN NOTE
Secondary to obstructive uropathy  Acute renal failure resolved  Discharge home with Valentine catheter

## 2018-10-07 NOTE — SOCIAL WORK
CM was asked to set up RN and home PT for patient  Pt needs a roller walker  CM arranged [facility] and ordered roller walker through [facility], which was delivered through [employer]  Pt d/c'ed home

## 2018-10-07 NOTE — PHYSICAL THERAPY NOTE
PT EVALUATION    80 y o     [id number]    Renal failure [N19]  Urinary retention with incomplete bladder emptying [R33 9]  Valentine catheter problem ([address] ) [A05  9XXA]    Past Medical History:   Diagnosis Date    BPH (benign prostatic hyperplasia)     Essential tremor     Hyperlipidemia     Hypertension          Past Surgical History:   Procedure Laterality Date    HIP SURGERY      TONSILECTOMY AND ADNOIDECTOMY        10/07/18 1355   Note Type   Note type Eval only   Pain Assessment   Pain Assessment No/denies pain   Home Living   Type of [address] [place] [address] Two level; Able to live on main level with bedroom/bathroom;Stairs to enter with rails  (1+1 CAMRYN, can stay on main level )   Bathroom Shower/Tub Tub/shower unit  (walk in shower on 2nd)   Bathroom Toilet Raised   Bathroom Equipment Grab bars in shower; Tub transfer bench; Toilet raiser   Bathroom Accessibility Accessible   Home Equipment Walker;Cane  (rollator, cane  SW)   Additional Comments driving and ambulating with cane PTA  Prior Function   Level of [place] Independent with ADLs and functional mobility   Lives With Spouse   Receives Help From Family   ADL Assistance Independent   IADLs Needs assistance   Falls in the last 6 months 1 to 4  (1)   Vocational Retired   Comments I wiht cane PTA  Noted more sedentary of late  Restrictions/Precautions   Weight Bearing Precautions Per Order No   Other Precautions Fall Risk;Multiple lines; Chair Alarm;Cognitive   General   Additional Pertinent History Pt is 81 y/o male admitted with urinary retention and acute on chronic kidney disease  Valentine in place  PT cnsulted  Up and OOB as tolerated     Family/Caregiver Present Yes   Cognition   Overall Cognitive Status Impaired   Arousal/Participation Alert   Attention Attends with cues to redirect   Following Commands Follows one step commands with increased time or repetition   RUE Assessment   RUE Assessment WFL  (grossly 4/5, + essential tremor)   LUE Assessment   LUE Assessment WFL  (grossly 4/5, + essential tremor)   RLE Assessment   RLE Assessment WFL  (grossly 4-/5)   LLE Assessment   LLE Assessment WFL  (grossly 4-/5)   Light Touch   RLE Light Touch Grossly intact   LLE Light Touch Grossly intact   Bed Mobility   Additional Comments received sitting OOB in chair  Transfers   Sit to Stand 4  Minimal assistance   Additional items Increased time required;Armrests  (cues for hand placement  Close S/CG)   Stand to Sit 4  Minimal assistance   Additional items Assist x 1; Increased time required;Verbal cues;Armrests  (cues for hand placement and to keep RW with him )   Additional Comments cues for hand placement  Increased time for transitions  Ambulation/Elevation   Gait pattern Decreased foot clearance; Improper Weight shift; Forward Flexion; Short stride; Excessively slow   Gait Assistance 4  Minimal assist   Additional items Assist x 1;Verbal cues; Tactile cues   Assistive Device Rolling walker   Distance Amb with RW 20'x1  Balance   Static Sitting Good   Dynamic Sitting Fair +   Static Standing Fair   Dynamic Standing Fair -   Ambulatory Fair -   Activity Tolerance   Activity Tolerance Patient tolerated treatment well;Patient limited by fatigue   Medical Staff Made Aware NurseCorry   Nurse Made Aware yes   Assessment   Prognosis Good   Problem List Decreased strength;Decreased endurance; Impaired balance;Decreased mobility; Decreased cognition;Decreased safety awareness  (tremor)   Assessment Pt is 79 y/o male admitted with urinary retention requiring sifuentes placement  Acute on chronic kidney disease  Baseline noted to be independent with use of cane in home, rollator in community per spouse  +   More sedentary of late per pt and family  Resides with spouse and can have first floor set up initially  1+1 CAMRYN home  Hx of one recent fall  Presents with impairments in LE strength, balance, activity tolerance and overall mobility   Luna needed for transfers and ambulation short distances with RW support  Discussed d/c planning with pt and family  Declines rehab, but amendable to HHPT at d/c  Rec RW at d/c and 24* support from family given at this time requiring Ju  Family and pt reporting plan to d/c today  Offered to progress with ambulation and trial of stairs in preparation for home  Pt and family decline at this time and report no concerns based on current mobility status for d/c to home  PT will follow and progress if not d/c in order to optimize functional independence and safety  Barriers to Discharge Inaccessible home environment   Barriers to Discharge Comments 1+1 CAMRYN   Goals   Patient Goals go home today   STG Expiration Date 10/17/18   Short Term Goal #1 10 days: 1)  Pt will perform bed mobility with Laury demonstrating appropriate technique 100% of the time in order to improve function  2)  Perform all transfers with Laury demonstrating safe and appropriate technique 100% of the time in order to improve ability to negotiate safely in home environment  3) Amb with least restrictive AD > 200'x1 with mod I in order to demonstrate ability to negotiate in home environment  4)  Improve overall strength and balance 1/2 grade in order to optimize ability to perform functional tasks and reduce fall risk  5) Increase activity tolerance to 45 minutes in order to improve endurance to functional tasks  6)  Negotiate stairs using most appropriate technique and S in order to be able to negotiate safely in home environment  7) PT for ongoing patient and family/caregiver education, DME needs and d/c planning in order to promote highest level of function in least restrictive environment  Treatment Day 0   Plan   Treatment/Interventions Functional transfer training;LE strengthening/ROM; Elevations; Therapeutic exercise; Endurance training;Patient/family training;Equipment eval/education; Bed mobility;Gait training; Compensatory technique education;Continued evaluation;Spoke to nursing;Family;Spoke to case management   PT Frequency Other (Comment)  (4-5x/wk)   Recommendation   Recommendation Home PT;24 hour supervision/assist  (declines STR)   Equipment Recommended Walker  (RW)   Additional Comments has not acheieved mobiltiy goals      Modified Owen Scale   Modified Karey Scale 4   Barthel Index   Feeding 10   Bathing 0   Grooming Score 5   Dressing Score 5   Bladder Score 0   Bowels Score 10   Toilet Use Score 10   Transfers (Bed/Chair) Score 10   Mobility (Level Surface) Score 0   Stairs Score 0   Barthel Index Score 50     History: co - morbidities, fall risk, use of assistive device, assist for adl's, cognition, multiple lines, CAMRYN  Exam: impairments in locomotion, musculoskeletal, balance,posture, joint integrity, skin integrity, cognition, barthel 50  Clinical: unstable/unpredictable ( fall risk, chair alarm, more restrictive AD use, function below baseline)  Complexity:high    Lelon Lacrosse, PT

## 2018-10-07 NOTE — PLAN OF CARE
Problem: PHYSICAL THERAPY ADULT  Goal: Performs mobility at highest level of function for planned discharge setting  See evaluation for individualized goals  Treatment/Interventions: Functional transfer training, LE strengthening/ROM, Elevations, Therapeutic exercise, Endurance training, Patient/family training, Equipment eval/education, Bed mobility, Gait training, Compensatory technique education, Continued evaluation, Spoke to nursing, Family, Spoke to case management  Equipment Recommended: Ryan Rosas (CHANCE)       See flowsheet documentation for full assessment, interventions and recommendations  Outcome: Progressing  Prognosis: Good  Problem List: Decreased strength, Decreased endurance, Impaired balance, Decreased mobility, Decreased cognition, Decreased safety awareness (tremor)  Assessment: Pt is 81 y/o male admitted with urinary retention requiring sifuentes placement  Acute on chronic kidney disease  Baseline noted to be independent with use of cane in home, rollator in community per spouse  +   More sedentary of late per pt and family  Resides with spouse and can have first floor set up initially  1+1 CAMRYN home  Hx of one recent fall  Presents with impairments in LE strength, balance, activity tolerance and overall mobility  Ju needed for transfers and ambulation short distances with RW support  Discussed d/c planning with pt and family  Declines rehab, but amendable to HHPT at d/c  Rec RW at d/c and 24* support from family given at this time requiring Ju  Family and pt reporting plan to d/c today  Offered to progress with ambulation and trial of stairs in preparation for home  Pt and family decline at this time and report no concerns based on current mobility status for d/c to home  PT will follow and progress if not d/c in order to optimize functional independence and safety    Barriers to Discharge: Inaccessible home environment  Barriers to Discharge Comments: 1+1 CAMRYN  Recommendation: Home PT, 24 hour supervision/assist (declines STR)          See flowsheet documentation for full assessment

## 2018-10-07 NOTE — NURSING NOTE
Patient was given and read the AVS   Patient wife was given the prescriptions for the patient  Patient was transported to the parking deck by the PCA

## 2018-10-08 NOTE — TELEPHONE ENCOUNTER
Spoke with wife, she wanted Dr Bouchra Hernandez only as he saw Dr Bouchra Hernandez before  Patient scheduled for 10/17

## 2018-10-12 ENCOUNTER — TELEPHONE (OUTPATIENT)
Dept: UROLOGY | Facility: MEDICAL CENTER | Age: 83
End: 2018-10-12

## 2018-10-12 NOTE — TELEPHONE ENCOUNTER
Spoke with Curtis Shay; Dr Trip Mayo will see the patient on 10/17/2018 and from there, will determine wether or not it is to remain  If he chooses to have it removed, it'll be done by our staff at the time of his appointment  She has no further questions or concerns at this time

## 2018-10-12 NOTE — TELEPHONE ENCOUNTER
[facility] Visiting Nurse called  She wants to know if they should pull patient's catheter the morning of his upcoming appointment 10/17/18  Please call her at [phone]

## 2018-10-17 ENCOUNTER — OFFICE VISIT (OUTPATIENT)
Dept: UROLOGY | Facility: MEDICAL CENTER | Age: 83
End: 2018-10-17
Payer: MEDICARE

## 2018-10-17 VITALS
HEART RATE: 64 BPM | DIASTOLIC BLOOD PRESSURE: 100 MMHG | BODY MASS INDEX: 30.07 KG/M2 | WEIGHT: 222 LBS | SYSTOLIC BLOOD PRESSURE: 142 MMHG | HEIGHT: 72 IN

## 2018-10-17 DIAGNOSIS — R33.8 ACUTE URINARY RETENTION: Primary | ICD-10-CM

## 2018-10-17 DIAGNOSIS — N40.1 BENIGN PROSTATIC HYPERPLASIA WITH LOWER URINARY TRACT SYMPTOMS, SYMPTOM DETAILS UNSPECIFIED: ICD-10-CM

## 2018-10-17 PROCEDURE — 99214 OFFICE O/P EST MOD 30 MIN: CPT | Performed by: UROLOGY

## 2018-10-17 NOTE — LETTER
October 17, 2018     Silvano Tariq MD  [address]    Patient: Montana Wick   YOB: 1935   Date of Visit: 10/17/2018       Dear Dr Urmila Lucero: Thank you for referring Montana Wick to me for evaluation  Below are my notes for this consultation  If you have questions, please do not hesitate to call me  I look forward to following your patient along with you  Sincerely,        Jada Hardin MD        CC: No Recipients  Jada Hardin MD  10/17/2018  5:02 PM  Sign at close encounter  Assessment/Plan:  1  Acute urinary retention/BPH with obstruction-the patient is currently on alpha blockade and 5 alpha reductase inhibition  I do not feel that this late the afternoon is appropriate for a voiding trial and therefore will reschedule the patient for a voiding trial at the same time as cystoscopy  I am somewhat reluctant to give the patient a voiding trial without TURP or some form of prostate surgery such as Uro lift or laser vaporization the prostate as the patient has suffered bilateral hydronephrosis and acute renal failure from urinary retention  In any event the patient will be brought in a morning appointment for a potential voiding trial after cystoscopy  The patient may very well require surgical intervention for BPH at that time  Diagnoses and all orders for this visit:    Acute urinary retention  -     Cystoscopy; Future    Benign prostatic hyperplasia with lower urinary tract symptoms, symptom details unspecified          Subjective:     Patient ID: Montana Wick is a 80 y o  male  Chief complaint:  Acute urinary retention, acute renal failure    History of present illness: This is an 25-year-old male who has been followed in this office for prostatic enlargement    The patient was last seen in this office for an elevated PSA in December of 2014 at which time his AUA symptom score was 5 any claim no significant obstructive or irritative voiding symptoms  Patient's prostate was enlarged at that time at approximately 30 g by digital estimate  The patient unfortunately developed urinary retention and was admitted to [facility] at which time he was found to have a creatinine of 2 36 and on renal ultrasound bilateral hydronephrosis with CT scanning revealing the same plus a probable 3 mm distal right ureteral calculus in a nonobstructing position  The patient presented to the office today expecting removal of a Valentine catheter noting that he is currently on tamsulosin 0 4 mg p o  daily and finasteride 5 mg p o  daily  Review of Systems   Constitutional: Positive for activity change and fatigue  HENT: Negative  Eyes: Negative  Small cataract   Respiratory: Negative  Cardiovascular: Negative  Gastrointestinal: Negative  Genitourinary: Positive for difficulty urinating, frequency and urgency  Musculoskeletal: Positive for arthralgias, back pain and myalgias  Neurological:        Essential tremor   Psychiatric/Behavioral: Negative  Objective:     Physical Exam   Constitutional: He is oriented to person, place, and time  He appears well-developed and well-nourished  HENT:   Head: Normocephalic and atraumatic  Eyes: Conjunctivae and EOM are normal    Neck: Neck supple  Pulmonary/Chest: Effort normal  No respiratory distress  Abdominal: Soft  He exhibits no distension  Neurological: He is alert and oriented to person, place, and time  Psychiatric: He has a normal mood and affect  His behavior is normal  Judgment and thought content normal    Vitals reviewed

## 2018-10-17 NOTE — PROGRESS NOTES
Assessment/Plan:  1  Acute urinary retention/BPH with obstruction-the patient is currently on alpha blockade and 5 alpha reductase inhibition  I do not feel that this late the afternoon is appropriate for a voiding trial and therefore will reschedule the patient for a voiding trial at the same time as cystoscopy  I am somewhat reluctant to give the patient a voiding trial without TURP or some form of prostate surgery such as Uro lift or laser vaporization the prostate as the patient has suffered bilateral hydronephrosis and acute renal failure from urinary retention  In any event the patient will be brought in a morning appointment for a potential voiding trial after cystoscopy  The patient may very well require surgical intervention for BPH at that time  Diagnoses and all orders for this visit:    Acute urinary retention  -     Cystoscopy; Future    Benign prostatic hyperplasia with lower urinary tract symptoms, symptom details unspecified          Subjective:     Patient ID: Mable Loyola is a 80 y o  male  Chief complaint:  Acute urinary retention, acute renal failure    History of present illness: This is an 41-year-old male who has been followed in this office for prostatic enlargement  The patient was last seen in this office for an elevated PSA in December of 2014 at which time his AUA symptom score was 5 any claim no significant obstructive or irritative voiding symptoms  Patient's prostate was enlarged at that time at approximately 30 g by digital estimate  The patient unfortunately developed urinary retention and was admitted to [facility] at which time he was found to have a creatinine of 2 36 and on renal ultrasound bilateral hydronephrosis with CT scanning revealing the same plus a probable 3 mm distal right ureteral calculus in a nonobstructing position   The patient presented to the office today expecting removal of a Valentine catheter noting that he is currently on tamsulosin 0 4 mg p o  daily and finasteride 5 mg p o  daily  Review of Systems   Constitutional: Positive for activity change and fatigue  HENT: Negative  Eyes: Negative  Small cataract   Respiratory: Negative  Cardiovascular: Negative  Gastrointestinal: Negative  Genitourinary: Positive for difficulty urinating, frequency and urgency  Musculoskeletal: Positive for arthralgias, back pain and myalgias  Neurological:        Essential tremor   Psychiatric/Behavioral: Negative  Objective:     Physical Exam   Constitutional: He is oriented to person, place, and time  He appears well-developed and well-nourished  HENT:   Head: Normocephalic and atraumatic  Eyes: Conjunctivae and EOM are normal    Neck: Neck supple  Pulmonary/Chest: Effort normal  No respiratory distress  Abdominal: Soft  He exhibits no distension  Neurological: He is alert and oriented to person, place, and time  Psychiatric: He has a normal mood and affect  His behavior is normal  Judgment and thought content normal    Vitals reviewed

## 2018-11-01 ENCOUNTER — PROCEDURE VISIT (OUTPATIENT)
Dept: UROLOGY | Facility: MEDICAL CENTER | Age: 83
End: 2018-11-01
Payer: MEDICARE

## 2018-11-01 VITALS — SYSTOLIC BLOOD PRESSURE: 140 MMHG | DIASTOLIC BLOOD PRESSURE: 88 MMHG

## 2018-11-01 DIAGNOSIS — N28.9 RENAL INSUFFICIENCY: ICD-10-CM

## 2018-11-01 DIAGNOSIS — N40.1 BPH WITH URINARY OBSTRUCTION: Primary | ICD-10-CM

## 2018-11-01 DIAGNOSIS — N13.8 BPH WITH URINARY OBSTRUCTION: Primary | ICD-10-CM

## 2018-11-01 DIAGNOSIS — R33.9 URINARY RETENTION WITH INCOMPLETE BLADDER EMPTYING: ICD-10-CM

## 2018-11-01 DIAGNOSIS — N13.39 OTHER HYDRONEPHROSIS: ICD-10-CM

## 2018-11-01 PROCEDURE — 52000 CYSTOURETHROSCOPY: CPT | Performed by: UROLOGY

## 2018-11-01 PROCEDURE — 99215 OFFICE O/P EST HI 40 MIN: CPT | Performed by: UROLOGY

## 2018-11-01 NOTE — LETTER
November 1, 2018     Peterson Hyatt MD  [address]    Patient: Raquel Smith   YOB: 1935   Date of Visit: 11/1/2018       Dear Dr Robert Oliver: Thank you for referring Raquel Smith to me for evaluation  Below are my notes for this consultation  If you have questions, please do not hesitate to call me  I look forward to following your patient along with you  Sincerely,        Gopi Mcghee MD        CC: No Recipients  Gopi Mcghee MD  11/1/2018  8:31 AM  Sign at close encounter  Assessment/Plan:  1  Long-term obstruction with bladder decompensation, hydronephrosis leading to renal insufficiency  He has failed medical management, and needs TURP and temporary SP tube  Other technology such as laser or urolift will not adequately open up his prostate  Hopefully over time his renal insufficiency will get back to his baseline  Procedure potentially increased risk due to comorbidities of limited mobility, severe essential tremor, polypharmacy including Inderal, Neurontin, Lipitor, finasteride  2   He is quite anxious about procedures, he feels that the anesthesia from his hip surgery is why he could not urinate afterwards  I think it is much more likely the BPH led to that  3   We left the catheter out today at his request   Because he had 1200 mL retention before, it could take all day long or even into tonight before he gets that amount in his bladder, and he feels he needs to urinate  Nurse visit tomorrow to check PVR, if it is more than 400 mL, I will recommend Valentine back in place  He will think about his options, I have offered him a second opinion, and he speak to PCP about this  No problem-specific Assessment & Plan notes found for this encounter         Diagnoses and all orders for this visit:    BPH with urinary obstruction    Urinary retention with incomplete bladder emptying    Other hydronephrosis    Renal insufficiency          Subjective:      Patient ID: Mendel Galeana is a 80 y o  male  1   Follow-up for severe retention, 1200 mL when catheter placed four weeks ago  Had retention after hip surgery in the recent past as well  2   Long-term BPH, on herbal some years ago, recently terazosin  3   Hydronephrosis bilateral from the BPH  4  Renal insufficiency GFR went down to 29 the past month, two years ago it was 70-80  The following portions of the patient's history were reviewed and updated as appropriate: allergies, current medications, past family history, past medical history, past social history, past surgical history and problem list     Review of Systems   Neurological: Positive for tremors  All other systems reviewed and are negative  Objective:      /88          Physical Exam   Constitutional: He is oriented to person, place, and time  He appears well-developed and well-nourished  No distress  Frail, uses walker due to his tremor and decreased mobility  HENT:   Head: Normocephalic and atraumatic  Eyes: Conjunctivae are normal    Cardiovascular: Normal rate and regular rhythm  Pulmonary/Chest: Effort normal and breath sounds normal  No respiratory distress  He has no wheezes  Abdominal: Soft  Bowel sounds are normal  He exhibits no distension and no mass  There is no tenderness  Genitourinary:   Genitourinary Comments: Penis and testes normal    Prostate markedly enlarged irregular no nodules   Neurological: He is alert and oriented to person, place, and time  Skin: Skin is warm and dry  He is not diaphoretic  Cystoscopy  Date/Time: 11/1/2018 8:26 AM  Performed by: Barbara Figueroa  Authorized by: Barbara Figueroa     Procedure details: cystoscopy    Additional Procedure Details: The patient was carefully  positioned supine on the examining table  Sterile preparation was performed on the urethra    Xylocaine jelly was instilled and left  Indwelling for the procedure  The 13 Yoruba flexible cystoscope was passed with the following findings:      Urethra:   No strictures    Prostate:  Huge, all three lobes including large median lobe    Bladder:   Diverticula throughout, inflammation from the catheter  No obvious stones or tumors    Patient tolerated the procedure well and was escorted from the examining table

## 2018-11-01 NOTE — PROGRESS NOTES
Assessment/Plan:  1  Long-term obstruction with bladder decompensation, hydronephrosis leading to renal insufficiency  He has failed medical management, and needs TURP and temporary SP tube  Other technology such as laser or urolift will not adequately open up his prostate  Hopefully over time his renal insufficiency will get back to his baseline  Procedure potentially increased risk due to comorbidities of limited mobility, severe essential tremor, polypharmacy including Inderal, Neurontin, Lipitor, finasteride  2   He is quite anxious about procedures, he feels that the anesthesia from his hip surgery is why he could not urinate afterwards  I think it is much more likely the BPH led to that  3   We left the catheter out today at his request   Because he had 1200 mL retention before, it could take all day long or even into tonight before he gets that amount in his bladder, and he feels he needs to urinate  Nurse visit tomorrow to check PVR, if it is more than 400 mL, I will recommend Valentine back in place  He will think about his options, I have offered him a second opinion, and he speak to PCP about this  No problem-specific Assessment & Plan notes found for this encounter  Diagnoses and all orders for this visit:    BPH with urinary obstruction    Urinary retention with incomplete bladder emptying    Other hydronephrosis    Renal insufficiency          Subjective:      Patient ID: Ivette Booth is a 80 y o  male  1   Follow-up for severe retention, 1200 mL when catheter placed four weeks ago  Had retention after hip surgery in the recent past as well  2   Long-term BPH, on herbal some years ago, recently terazosin  3   Hydronephrosis bilateral from the BPH  4  Renal insufficiency GFR went down to 29 the past month, two years ago it was 70-80          The following portions of the patient's history were reviewed and updated as appropriate: allergies, current medications, past family history, past medical history, past social history, past surgical history and problem list     Review of Systems   Neurological: Positive for tremors  All other systems reviewed and are negative  Objective:      /88          Physical Exam   Constitutional: He is oriented to person, place, and time  He appears well-developed and well-nourished  No distress  Frail, uses walker due to his tremor and decreased mobility  HENT:   Head: Normocephalic and atraumatic  Eyes: Conjunctivae are normal    Cardiovascular: Normal rate and regular rhythm  Pulmonary/Chest: Effort normal and breath sounds normal  No respiratory distress  He has no wheezes  Abdominal: Soft  Bowel sounds are normal  He exhibits no distension and no mass  There is no tenderness  Genitourinary:   Genitourinary Comments: Penis and testes normal    Prostate markedly enlarged irregular no nodules   Neurological: He is alert and oriented to person, place, and time  Skin: Skin is warm and dry  He is not diaphoretic  Cystoscopy  Date/Time: 11/1/2018 8:26 AM  Performed by: Abe Moy  Authorized by: Abe Moy     Procedure details: cystoscopy    Additional Procedure Details: The patient was carefully  positioned supine on the examining table  Sterile preparation was performed on the urethra  Xylocaine jelly was instilled and left  Indwelling for the procedure  The 13 Turkish flexible cystoscope was passed with the following findings:      Urethra:   No strictures    Prostate:  Huge, all three lobes including large median lobe    Bladder:   Diverticula throughout, inflammation from the catheter  No obvious stones or tumors    Patient tolerated the procedure well and was escorted from the examining table

## 2018-11-02 ENCOUNTER — CLINICAL SUPPORT (OUTPATIENT)
Dept: UROLOGY | Facility: MEDICAL CENTER | Age: 83
End: 2018-11-02
Payer: MEDICARE

## 2018-11-02 VITALS
DIASTOLIC BLOOD PRESSURE: 78 MMHG | BODY MASS INDEX: 28.31 KG/M2 | HEIGHT: 72 IN | SYSTOLIC BLOOD PRESSURE: 124 MMHG | WEIGHT: 209 LBS

## 2018-11-02 DIAGNOSIS — R33.8 ACUTE URINARY RETENTION: ICD-10-CM

## 2018-11-02 DIAGNOSIS — R33.9 URINARY RETENTION WITH INCOMPLETE BLADDER EMPTYING: Primary | ICD-10-CM

## 2018-11-02 LAB — POST-VOID RESIDUAL VOLUME, ML POC: 999 ML

## 2018-11-02 PROCEDURE — 51702 INSERT TEMP BLADDER CATH: CPT

## 2018-11-02 PROCEDURE — 51798 US URINE CAPACITY MEASURE: CPT

## 2018-11-02 RX ORDER — TAMSULOSIN HYDROCHLORIDE 0.4 MG/1
0.4 CAPSULE ORAL
Qty: 90 CAPSULE | Refills: 0 | Status: SHIPPED | OUTPATIENT
Start: 2018-11-02 | End: 2018-11-02 | Stop reason: SDUPTHER

## 2018-11-02 RX ORDER — TAMSULOSIN HYDROCHLORIDE 0.4 MG/1
0.4 CAPSULE ORAL
Qty: 90 CAPSULE | Refills: 3 | Status: SHIPPED | OUTPATIENT
Start: 2018-11-02 | End: 2018-12-07 | Stop reason: ALTCHOICE

## 2018-11-02 RX ORDER — FINASTERIDE 5 MG/1
5 TABLET, FILM COATED ORAL DAILY
Qty: 90 TABLET | Refills: 3 | Status: SHIPPED | OUTPATIENT
Start: 2018-11-02 | End: 2018-12-07 | Stop reason: ALTCHOICE

## 2018-11-02 RX ORDER — FINASTERIDE 5 MG/1
5 TABLET, FILM COATED ORAL DAILY
Qty: 90 TABLET | Refills: 0 | Status: SHIPPED | OUTPATIENT
Start: 2018-11-02 | End: 2018-11-02 | Stop reason: SDUPTHER

## 2018-11-02 NOTE — PROGRESS NOTES
Bladder catheterization  Date/Time: 11/2/2018 11:01 AM  Performed by: Obdulio Red  Authorized by: Natalya Mcneil     Consent:     Consent obtained:  Verbal    Consent given by:  Patient  Universal protocol:     Patient identity confirmed:  Verbally with patient  Pre-procedure details:     Procedure purpose:  Therapeutic    Preparation: Patient was prepped and draped in usual sterile fashion    Anesthesia (see MAR for exact dosages): Anesthesia method:  Topical application  Procedure details:     Bladder irrigation: no      Catheter insertion:  Temporary indwelling    Catheter type:  Coude and Valentine    Catheter size:  14 Fr    Number of attempts:  1    Successful placement: yes      Urine characteristics:  Clear and yellow  Post-procedure details:     Patient tolerance of procedure: Tolerated well, no immediate complications  Comments:      PVR was over 999ml  Patient prepped and 14 fr coude catheter inserted using sterile technique  Urine return clear yellow  10cc inflated into balloon  1500ml of urine returned  Patient tolerated procedure well  Meatus site clean and free of debris, no irritation or redness noted  Patient denies fevers or urinary symptoms  Patient encouraged to maintain adequate fluid intake

## 2018-12-07 ENCOUNTER — OFFICE VISIT (OUTPATIENT)
Dept: UROLOGY | Facility: MEDICAL CENTER | Age: 83
End: 2018-12-07
Payer: MEDICARE

## 2018-12-07 VITALS
SYSTOLIC BLOOD PRESSURE: 132 MMHG | HEIGHT: 72 IN | WEIGHT: 220 LBS | HEART RATE: 78 BPM | BODY MASS INDEX: 29.8 KG/M2 | DIASTOLIC BLOOD PRESSURE: 80 MMHG

## 2018-12-07 DIAGNOSIS — N40.1 BPH WITH URINARY OBSTRUCTION: Primary | ICD-10-CM

## 2018-12-07 DIAGNOSIS — N13.8 BPH WITH URINARY OBSTRUCTION: Primary | ICD-10-CM

## 2018-12-07 DIAGNOSIS — R33.9 URINARY RETENTION WITH INCOMPLETE BLADDER EMPTYING: ICD-10-CM

## 2018-12-07 DIAGNOSIS — N48.89 PENILE EROSION: ICD-10-CM

## 2018-12-07 PROCEDURE — 51700 IRRIGATION OF BLADDER: CPT

## 2018-12-07 PROCEDURE — 99215 OFFICE O/P EST HI 40 MIN: CPT

## 2018-12-07 RX ORDER — MULTIVITAMIN
1 TABLET ORAL DAILY
COMMUNITY
End: 2018-12-28

## 2018-12-07 RX ORDER — SULFAMETHOXAZOLE AND TRIMETHOPRIM 800; 160 MG/1; MG/1
TABLET ORAL
Qty: 20 TABLET | Refills: 0 | Status: SHIPPED | OUTPATIENT
Start: 2018-12-07 | End: 2018-12-17

## 2018-12-07 NOTE — PROGRESS NOTES
Assessment/Plan:  1  Long discussion regarding retention, BPH, hydro  2   I again strongly recommend TURP and temporary SP tube  There is some small chance his bladder muscle will not empty well long-term any might need the SP indefinitely  3   He is very nervous but surgery for many personal reasons, after some discussion with patient wife, he says he will schedule but not until January 4   Procedure increased risk due to comorbidities including heart disease, hypertension, chronic pain, polypharmacy  No problem-specific Assessment & Plan notes found for this encounter  Diagnoses and all orders for this visit:    BPH with urinary obstruction  -     Urine culture    Urinary retention with incomplete bladder emptying  -     Bladder catheterization    Penile erosion    Other orders  -     Omega-3 Fatty Acids (FISH OIL OMEGA-3 PO); Take by mouth  -     Multiple Vitamin (MULTIVITAMIN) tablet; Take 1 tablet by mouth daily  -     Nutritional Supplements (CORTICOSTEROIDS SUPPORT PO); Take by mouth          Subjective:      Patient ID: Vandana De La Fuente is a 80 y o  male  1   Since our visit of five weeks ago, patient failed another voiding trial and Valentine catheter is in now  He has had chronic retention of over 1 L, bilateral hydronephrosis  2   Has developed the penile erosion from the Valentine, becoming more severe  The following portions of the patient's history were reviewed and updated as appropriate: allergies, current medications, past family history, past medical history, past social history, past surgical history and problem list     Review of Systems   All other systems reviewed and are negative  Objective:      /80   Pulse 78   Ht 6' (1 829 m)   Wt 99 8 kg (220 lb)   BMI 29 84 kg/m²          Physical Exam   Constitutional: He is oriented to person, place, and time  He appears well-developed and well-nourished  No distress  HENT:   Head: Normocephalic and atraumatic  Eyes: Conjunctivae are normal    Cardiovascular: Normal rate and regular rhythm  Pulmonary/Chest: Effort normal and breath sounds normal  No respiratory distress  He has no wheezes  Abdominal: Soft  Bowel sounds are normal  He exhibits no distension and no mass  There is no tenderness  Genitourinary:   Genitourinary Comments: Mild penile erosion  Erythema on glans    Testes normal   Neurological: He is alert and oriented to person, place, and time  Skin: Skin is warm and dry  He is not diaphoretic

## 2018-12-07 NOTE — PROGRESS NOTES
Bladder catheterization  Date/Time: 12/7/2018 12:44 PM  Performed by: Shaji Byers by: Dante Villela     Patient location:  Bedside  Consent:     Consent obtained:  Verbal    Consent given by:  Patient  Universal protocol:     Procedure explained and questions answered to patient or proxy's satisfaction: yes      Patient identity confirmed:  Verbally with patient  Procedure details:     Bladder irrigation: yes      Catheter insertion:  Temporary indwelling    Approach: natural orifice      Catheter type: Sifuentes    Catheter size:  16 Fr    Number of attempts:  1    Successful placement: yes    Post-procedure details:     Patient tolerance of procedure: Tolerated well, no immediate complications  Comments:      Pt was placed in the supine position  Sifuentes catheter removed without difficulty and a 16 Syriac sifuentes catheter was inserted into the bladder using sterile technique  10 cc inflated the balloon and an overnight bed bag was attached per pt request  Pt instructed to increase fluids  Unable to obtain urine sample for upcoming procedure

## 2018-12-07 NOTE — LETTER
December 7, 2018     Raul Krishna, [address]  [address] [facility]    Patient: Adalberto Rodas   YOB: 1935   Date of Visit: 12/7/2018       Dear Dr Carlos Zarco: Thank you for referring Adalberto Rodas to me for evaluation  Below are my notes for this consultation  If you have questions, please do not hesitate to call me  I look forward to following your patient along with you  Sincerely,        Morris Solorio MD        CC: No Recipients  Morris Solorio MD  12/7/2018  1:31 PM  Sign at close encounter  Assessment/Plan:  1  Long discussion regarding retention, BPH, hydro  2   I again strongly recommend TURP and temporary SP tube  There is some small chance his bladder muscle will not empty well long-term any might need the SP indefinitely  3   He is very nervous but surgery for many personal reasons, after some discussion with patient wife, he says he will schedule but not until January 4   Procedure increased risk due to comorbidities including heart disease, hypertension, chronic pain, polypharmacy  No problem-specific Assessment & Plan notes found for this encounter  Diagnoses and all orders for this visit:    BPH with urinary obstruction  -     Urine culture    Urinary retention with incomplete bladder emptying  -     Bladder catheterization    Penile erosion    Other orders  -     Omega-3 Fatty Acids (FISH OIL OMEGA-3 PO); Take by mouth  -     Multiple Vitamin (MULTIVITAMIN) tablet; Take 1 tablet by mouth daily  -     Nutritional Supplements (CORTICOSTEROIDS SUPPORT PO); Take by mouth          Subjective:      Patient ID: Adalberto Rodas is a 80 y o  male  1   Since our visit of five weeks ago, patient failed another voiding trial and Valentine catheter is in now  He has had chronic retention of over 1 L, bilateral hydronephrosis  2   Has developed the penile erosion from the Valentine, becoming more severe          The following portions of the patient's history were reviewed and updated as appropriate: allergies, current medications, past family history, past medical history, past social history, past surgical history and problem list     Review of Systems   All other systems reviewed and are negative  Objective:      /80   Pulse 78   Ht 6' (1 829 m)   Wt 99 8 kg (220 lb)   BMI 29 84 kg/m²           Physical Exam   Constitutional: He is oriented to person, place, and time  He appears well-developed and well-nourished  No distress  HENT:   Head: Normocephalic and atraumatic  Eyes: Conjunctivae are normal    Cardiovascular: Normal rate and regular rhythm  Pulmonary/Chest: Effort normal and breath sounds normal  No respiratory distress  He has no wheezes  Abdominal: Soft  Bowel sounds are normal  He exhibits no distension and no mass  There is no tenderness  Genitourinary:   Genitourinary Comments: Mild penile erosion  Erythema on glans    Testes normal   Neurological: He is alert and oriented to person, place, and time  Skin: Skin is warm and dry  He is not diaphoretic

## 2018-12-27 ENCOUNTER — ANESTHESIA EVENT (OUTPATIENT)
Dept: PERIOP | Facility: HOSPITAL | Age: 83
End: 2018-12-27
Payer: MEDICARE

## 2018-12-27 RX ORDER — SODIUM CHLORIDE 9 MG/ML
125 INJECTION, SOLUTION INTRAVENOUS CONTINUOUS
Status: CANCELLED | OUTPATIENT
Start: 2019-01-08

## 2018-12-28 ENCOUNTER — APPOINTMENT (OUTPATIENT)
Dept: PREADMISSION TESTING | Facility: HOSPITAL | Age: 83
End: 2018-12-28
Payer: MEDICARE

## 2018-12-28 ENCOUNTER — APPOINTMENT (OUTPATIENT)
Dept: LAB | Facility: HOSPITAL | Age: 83
End: 2018-12-28
Attending: UROLOGY
Payer: MEDICARE

## 2018-12-28 ENCOUNTER — HOSPITAL ENCOUNTER (OUTPATIENT)
Dept: NON INVASIVE DIAGNOSTICS | Facility: HOSPITAL | Age: 83
Discharge: HOME | End: 2018-12-28
Attending: UROLOGY
Payer: MEDICARE

## 2018-12-28 DIAGNOSIS — Z79.01 LONG TERM (CURRENT) USE OF ANTICOAGULANTS: ICD-10-CM

## 2018-12-28 DIAGNOSIS — N13.8 ENLARGED PROSTATE WITH URINARY OBSTRUCTION: ICD-10-CM

## 2018-12-28 DIAGNOSIS — N40.1 ENLARGED PROSTATE WITH URINARY OBSTRUCTION: ICD-10-CM

## 2018-12-28 DIAGNOSIS — Z01.810 PRE-OPERATIVE CARDIOVASCULAR EXAMINATION: ICD-10-CM

## 2018-12-28 DIAGNOSIS — R39.89 SUSPECTED UTI: ICD-10-CM

## 2018-12-28 DIAGNOSIS — Z01.812 PRE-OPERATIVE LABORATORY EXAMINATION: ICD-10-CM

## 2018-12-28 LAB
ALBUMIN SERPL BCP-MCNC: 3.3 G/DL (ref 3.5–5)
ALP SERPL-CCNC: 80 U/L (ref 46–116)
ALT SERPL W P-5'-P-CCNC: 33 U/L (ref 12–78)
ANION GAP SERPL CALCULATED.3IONS-SCNC: 7 MMOL/L (ref 4–13)
APTT PPP: 32 SECONDS (ref 26–38)
AST SERPL W P-5'-P-CCNC: 24 U/L (ref 5–45)
BASOPHILS # BLD AUTO: 0.03 THOUSANDS/ΜL (ref 0–0.1)
BASOPHILS NFR BLD AUTO: 0 % (ref 0–1)
BILIRUB SERPL-MCNC: 0.25 MG/DL (ref 0.2–1)
BUN SERPL-MCNC: 25 MG/DL (ref 5–25)
CALCIUM SERPL-MCNC: 8.6 MG/DL (ref 8.3–10.1)
CHLORIDE SERPL-SCNC: 101 MMOL/L (ref 100–108)
CO2 SERPL-SCNC: 29 MMOL/L (ref 21–32)
CREAT SERPL-MCNC: 1.44 MG/DL (ref 0.6–1.3)
EOSINOPHIL # BLD AUTO: 0.12 THOUSAND/ΜL (ref 0–0.61)
EOSINOPHIL NFR BLD AUTO: 1 % (ref 0–6)
ERYTHROCYTE [DISTWIDTH] IN BLOOD BY AUTOMATED COUNT: 13.3 % (ref 11.6–15.1)
GFR SERPL CREATININE-BSD FRML MDRD: 45 ML/MIN/1.73SQ M
GLUCOSE SERPL-MCNC: 97 MG/DL (ref 65–140)
HCT VFR BLD AUTO: 36.6 % (ref 36.5–49.3)
HGB BLD-MCNC: 11.6 G/DL (ref 12–17)
IMM GRANULOCYTES # BLD AUTO: 0.03 THOUSAND/UL (ref 0–0.2)
IMM GRANULOCYTES NFR BLD AUTO: 0 % (ref 0–2)
INR PPP: 0.99 (ref 0.86–1.17)
LYMPHOCYTES # BLD AUTO: 2.28 THOUSANDS/ΜL (ref 0.6–4.47)
LYMPHOCYTES NFR BLD AUTO: 26 % (ref 14–44)
MCH RBC QN AUTO: 31.4 PG (ref 26.8–34.3)
MCHC RBC AUTO-ENTMCNC: 31.7 G/DL (ref 31.4–37.4)
MCV RBC AUTO: 99 FL (ref 82–98)
MONOCYTES # BLD AUTO: 0.59 THOUSAND/ΜL (ref 0.17–1.22)
MONOCYTES NFR BLD AUTO: 7 % (ref 4–12)
NEUTROPHILS # BLD AUTO: 5.85 THOUSANDS/ΜL (ref 1.85–7.62)
NEUTS SEG NFR BLD AUTO: 66 % (ref 43–75)
NRBC BLD AUTO-RTO: 0 /100 WBCS
PLATELET # BLD AUTO: 310 THOUSANDS/UL (ref 149–390)
PMV BLD AUTO: 9.2 FL (ref 8.9–12.7)
POTASSIUM SERPL-SCNC: 4.3 MMOL/L (ref 3.5–5.3)
PROT SERPL-MCNC: 7 G/DL (ref 6.4–8.2)
PROTHROMBIN TIME: 13.2 SECONDS (ref 11.8–14.2)
RBC # BLD AUTO: 3.69 MILLION/UL (ref 3.88–5.62)
SODIUM SERPL-SCNC: 137 MMOL/L (ref 136–145)
WBC # BLD AUTO: 8.9 THOUSAND/UL (ref 4.31–10.16)

## 2018-12-28 PROCEDURE — 80053 COMPREHEN METABOLIC PANEL: CPT

## 2018-12-28 PROCEDURE — 87186 SC STD MICRODIL/AGAR DIL: CPT | Performed by: UROLOGY

## 2018-12-28 PROCEDURE — 85025 COMPLETE CBC W/AUTO DIFF WBC: CPT

## 2018-12-28 PROCEDURE — 87086 URINE CULTURE/COLONY COUNT: CPT | Performed by: UROLOGY

## 2018-12-28 PROCEDURE — 85610 PROTHROMBIN TIME: CPT

## 2018-12-28 PROCEDURE — 36415 COLL VENOUS BLD VENIPUNCTURE: CPT

## 2018-12-28 PROCEDURE — 85730 THROMBOPLASTIN TIME PARTIAL: CPT

## 2018-12-28 PROCEDURE — 87147 CULTURE TYPE IMMUNOLOGIC: CPT | Performed by: UROLOGY

## 2018-12-28 PROCEDURE — 93005 ELECTROCARDIOGRAM TRACING: CPT | Performed by: UROLOGY

## 2018-12-28 RX ORDER — MULTIVIT WITH MINERALS/LUTEIN
500 TABLET ORAL DAILY
COMMUNITY

## 2018-12-28 RX ORDER — AMPICILLIN TRIHYDRATE 250 MG
CAPSULE ORAL DAILY
COMMUNITY

## 2018-12-28 RX ORDER — TAMSULOSIN HYDROCHLORIDE 0.4 MG/1
0.4 CAPSULE ORAL
COMMUNITY
End: 2019-11-11 | Stop reason: SDUPTHER

## 2018-12-28 RX ORDER — MULTIVIT-MIN/IRON/FOLIC ACID/K 18-600-40
CAPSULE ORAL
COMMUNITY

## 2018-12-28 RX ORDER — ATORVASTATIN CALCIUM 20 MG/1
20 TABLET, FILM COATED ORAL DAILY
COMMUNITY

## 2018-12-28 RX ORDER — FINASTERIDE 5 MG/1
5 TABLET, FILM COATED ORAL DAILY
COMMUNITY
End: 2019-10-22 | Stop reason: SDUPTHER

## 2018-12-28 RX ORDER — SODIUM CHLORIDE 9 MG/ML
125 INJECTION, SOLUTION INTRAVENOUS CONTINUOUS
Status: CANCELLED | OUTPATIENT
Start: 2019-01-08

## 2018-12-28 NOTE — PRE-PROCEDURE INSTRUCTIONS
Pre-Surgery Instructions:   Medication Instructions    ascorbic acid (VITAMIN C) 250 mg tablet Patient was instructed by Physician and understands   aspirin (ECOTRIN LOW STRENGTH) 81 mg EC tablet Patient was instructed by Physician and understands   atorvastatin (LIPITOR) 20 mg tablet Patient was instructed by Physician and understands   Cholecalciferol (VITAMIN D) 2000 units CAPS Patient was instructed by Physician and understands   Coenzyme Q10 (COQ10) 200 MG CAPS Patient was instructed by Physician and understands   cyanocobalamin 100 MCG tablet Patient was instructed by Physician and understands   finasteride (PROSCAR) 5 mg tablet Patient was instructed by Physician and understands   gabapentin (NEURONTIN) 100 mg capsule Patient was instructed by Physician and understands   Omega-3 Fatty Acids (FISH OIL OMEGA-3 PO) Patient was instructed by Physician and understands   propranolol (INDERAL) 20 mg tablet Patient was instructed by Physician and understands   propranolol (INDERAL) 60 mg tablet Patient was instructed by Physician and understands   tamsulosin (FLOMAX) 0 4 mg Patient was instructed by Physician and understands  Patient seen by Dr Jarocho Red  instructed to take* propanolol with a sip of water the morning of surgery  Patient given/ instructed on use of chlorhexidine soap per hospital protocol    Patient instructed to stop all ASA, NSAIDS, vitamins and herbal supplements one week prior to surgery or per Dr Adilene Mlilan

## 2018-12-28 NOTE — ANESTHESIA PREPROCEDURE EVALUATION
Review of Systems/Medical History  Patient summary reviewed        Cardiovascular  Negative cardio ROS Hyperlipidemia, Hypertension controlled,    Pulmonary  Negative pulmonary ROS        GI/Hepatic  Negative GI/hepatic ROS          Negative  ROS        Endo/Other  Negative endo/other ROS      GYN  Negative gynecology ROS          Hematology  Negative hematology ROS      Musculoskeletal  Negative musculoskeletal ROS   Comment: CENTRAL TREMORS  LIMITED ROM NECK NEEDING 2 PILLOWS      Neurology  Negative neurology ROS     Comment: Severe begin tremors  Psychology   Negative psychology ROS              Physical Exam    Airway    Mallampati score: II  TM Distance: >3 FB  Neck ROM: full     Dental   No notable dental hx     Cardiovascular  Comment: Negative ROS, Rhythm: regular, Rate: normal, Cardiovascular exam normal    Pulmonary  Pulmonary exam normal Breath sounds clear to auscultation,     Other Findings        Anesthesia Plan  ASA Score- 3     Anesthesia Type- general and IV sedation with anesthesia with ASA Monitors  Additional Monitors:   Airway Plan:         Plan Factors-    Induction- intravenous  Postoperative Plan-     Informed Consent- Anesthetic plan and risks discussed with patient

## 2018-12-29 LAB
ATRIAL RATE: 64 BPM
P AXIS: 35 DEGREES
PR INTERVAL: 264 MS
QRS AXIS: 8 DEGREES
QRSD INTERVAL: 130 MS
QT INTERVAL: 420 MS
QTC INTERVAL: 433 MS
T WAVE AXIS: 9 DEGREES
VENTRICULAR RATE: 64 BPM

## 2018-12-29 PROCEDURE — 93010 ELECTROCARDIOGRAM REPORT: CPT | Performed by: INTERNAL MEDICINE

## 2018-12-30 LAB — BACTERIA UR CULT: ABNORMAL

## 2018-12-31 ENCOUNTER — TELEPHONE (OUTPATIENT)
Dept: UROLOGY | Facility: MEDICAL CENTER | Age: 83
End: 2018-12-31

## 2018-12-31 DIAGNOSIS — N30.00 ACUTE CYSTITIS WITHOUT HEMATURIA: Primary | ICD-10-CM

## 2018-12-31 RX ORDER — NITROFURANTOIN 25; 75 MG/1; MG/1
100 CAPSULE ORAL 2 TIMES DAILY
Qty: 28 CAPSULE | Refills: 0 | Status: SHIPPED | OUTPATIENT
Start: 2018-12-31 | End: 2019-01-09 | Stop reason: HOSPADM

## 2018-12-31 NOTE — TELEPHONE ENCOUNTER
Soraida Espinosa MD  [facility] [place] [id number] Clinical             I sent macrobid BID to pharmacy, start tomorrow, thru surgery next wk      Pt notified

## 2019-01-03 NOTE — TELEPHONE ENCOUNTER
Spoke to pt's spouse who wanted to know if pt is to take the Macrobid on the day of the procedure which is 1/8/19  Please advise

## 2019-01-08 ENCOUNTER — HOSPITAL ENCOUNTER (OUTPATIENT)
Facility: HOSPITAL | Age: 84
Setting detail: OUTPATIENT SURGERY
Discharge: HOME HEALTH | End: 2019-01-09
Attending: UROLOGY | Admitting: UROLOGY
Payer: MEDICARE

## 2019-01-08 ENCOUNTER — ANESTHESIA (OUTPATIENT)
Dept: PERIOP | Facility: HOSPITAL | Age: 84
End: 2019-01-08
Payer: MEDICARE

## 2019-01-08 DIAGNOSIS — N13.8 ENLARGED PROSTATE WITH URINARY OBSTRUCTION: ICD-10-CM

## 2019-01-08 DIAGNOSIS — R33.8 ACUTE URINARY RETENTION: Primary | ICD-10-CM

## 2019-01-08 DIAGNOSIS — N40.1 ENLARGED PROSTATE WITH URINARY OBSTRUCTION: ICD-10-CM

## 2019-01-08 LAB
ABO GROUP BLD: NORMAL
BLD GP AB SCN SERPL QL: NEGATIVE
PLATELET # BLD AUTO: 291 THOUSANDS/UL (ref 149–390)
PMV BLD AUTO: 9.5 FL (ref 8.9–12.7)
RH BLD: POSITIVE
SPECIMEN EXPIRATION DATE: NORMAL

## 2019-01-08 PROCEDURE — 88305 TISSUE EXAM BY PATHOLOGIST: CPT | Performed by: PATHOLOGY

## 2019-01-08 PROCEDURE — C2627 CATH, SUPRAPUBIC/CYSTOSCOPIC: HCPCS | Performed by: UROLOGY

## 2019-01-08 PROCEDURE — 51102 DRAIN BL W/CATH INSERTION: CPT | Performed by: UROLOGY

## 2019-01-08 PROCEDURE — 86850 RBC ANTIBODY SCREEN: CPT | Performed by: UROLOGY

## 2019-01-08 PROCEDURE — 52601 PROSTATECTOMY (TURP): CPT | Performed by: UROLOGY

## 2019-01-08 PROCEDURE — 85049 AUTOMATED PLATELET COUNT: CPT | Performed by: UROLOGY

## 2019-01-08 PROCEDURE — 86901 BLOOD TYPING SEROLOGIC RH(D): CPT | Performed by: UROLOGY

## 2019-01-08 PROCEDURE — 86900 BLOOD TYPING SEROLOGIC ABO: CPT | Performed by: UROLOGY

## 2019-01-08 RX ORDER — FENTANYL CITRATE/PF 50 MCG/ML
25 SYRINGE (ML) INJECTION
Status: DISCONTINUED | OUTPATIENT
Start: 2019-01-08 | End: 2019-01-08 | Stop reason: HOSPADM

## 2019-01-08 RX ORDER — PROPRANOLOL HYDROCHLORIDE 20 MG/1
60 TABLET ORAL EVERY MORNING
Status: DISCONTINUED | OUTPATIENT
Start: 2019-01-09 | End: 2019-01-09 | Stop reason: HOSPADM

## 2019-01-08 RX ORDER — HEPARIN SODIUM 5000 [USP'U]/ML
5000 INJECTION, SOLUTION INTRAVENOUS; SUBCUTANEOUS EVERY 8 HOURS SCHEDULED
Status: DISCONTINUED | OUTPATIENT
Start: 2019-01-08 | End: 2019-01-09 | Stop reason: HOSPADM

## 2019-01-08 RX ORDER — SORBITOL 30 G/1000ML
IRRIGANT IRRIGATION AS NEEDED
Status: DISCONTINUED | OUTPATIENT
Start: 2019-01-08 | End: 2019-01-08 | Stop reason: HOSPADM

## 2019-01-08 RX ORDER — FINASTERIDE 5 MG/1
5 TABLET, FILM COATED ORAL DAILY
Status: DISCONTINUED | OUTPATIENT
Start: 2019-01-09 | End: 2019-01-09 | Stop reason: HOSPADM

## 2019-01-08 RX ORDER — PROPOFOL 10 MG/ML
INJECTION, EMULSION INTRAVENOUS AS NEEDED
Status: DISCONTINUED | OUTPATIENT
Start: 2019-01-08 | End: 2019-01-08 | Stop reason: SURG

## 2019-01-08 RX ORDER — UBIDECARENONE 75 MG
100 CAPSULE ORAL DAILY
Status: DISCONTINUED | OUTPATIENT
Start: 2019-01-09 | End: 2019-01-09 | Stop reason: HOSPADM

## 2019-01-08 RX ORDER — NITROFURANTOIN 25; 75 MG/1; MG/1
100 CAPSULE ORAL 2 TIMES DAILY
Status: DISCONTINUED | OUTPATIENT
Start: 2019-01-08 | End: 2019-01-09 | Stop reason: HOSPADM

## 2019-01-08 RX ORDER — SODIUM CHLORIDE 9 MG/ML
125 INJECTION, SOLUTION INTRAVENOUS CONTINUOUS
Status: DISCONTINUED | OUTPATIENT
Start: 2019-01-08 | End: 2019-01-08

## 2019-01-08 RX ORDER — TAMSULOSIN HYDROCHLORIDE 0.4 MG/1
0.4 CAPSULE ORAL
Status: DISCONTINUED | OUTPATIENT
Start: 2019-01-08 | End: 2019-01-09 | Stop reason: HOSPADM

## 2019-01-08 RX ORDER — GABAPENTIN 100 MG/1
100 CAPSULE ORAL 2 TIMES DAILY
Status: DISCONTINUED | OUTPATIENT
Start: 2019-01-08 | End: 2019-01-09 | Stop reason: HOSPADM

## 2019-01-08 RX ORDER — FENTANYL CITRATE 50 UG/ML
INJECTION, SOLUTION INTRAMUSCULAR; INTRAVENOUS AS NEEDED
Status: DISCONTINUED | OUTPATIENT
Start: 2019-01-08 | End: 2019-01-08 | Stop reason: SURG

## 2019-01-08 RX ORDER — ATORVASTATIN CALCIUM 20 MG/1
20 TABLET, FILM COATED ORAL
Status: DISCONTINUED | OUTPATIENT
Start: 2019-01-09 | End: 2019-01-09 | Stop reason: HOSPADM

## 2019-01-08 RX ORDER — OXYCODONE HYDROCHLORIDE AND ACETAMINOPHEN 5; 325 MG/1; MG/1
2 TABLET ORAL EVERY 4 HOURS PRN
Status: DISCONTINUED | OUTPATIENT
Start: 2019-01-08 | End: 2019-01-09 | Stop reason: HOSPADM

## 2019-01-08 RX ORDER — ASCORBIC ACID 500 MG
500 TABLET ORAL DAILY
Status: DISCONTINUED | OUTPATIENT
Start: 2019-01-09 | End: 2019-01-09 | Stop reason: HOSPADM

## 2019-01-08 RX ORDER — CHOLECALCIFEROL (VITAMIN D3) 125 MCG
200 CAPSULE ORAL DAILY
Status: DISCONTINUED | OUTPATIENT
Start: 2019-01-09 | End: 2019-01-09 | Stop reason: HOSPADM

## 2019-01-08 RX ORDER — VANCOMYCIN HYDROCHLORIDE 1 G/200ML
1000 INJECTION, SOLUTION INTRAVENOUS ONCE
Status: COMPLETED | OUTPATIENT
Start: 2019-01-08 | End: 2019-01-08

## 2019-01-08 RX ORDER — OXYBUTYNIN CHLORIDE 5 MG/1
5 TABLET ORAL 2 TIMES DAILY
Status: DISCONTINUED | OUTPATIENT
Start: 2019-01-08 | End: 2019-01-09 | Stop reason: HOSPADM

## 2019-01-08 RX ORDER — EPHEDRINE SULFATE 50 MG/ML
INJECTION, SOLUTION INTRAVENOUS AS NEEDED
Status: DISCONTINUED | OUTPATIENT
Start: 2019-01-08 | End: 2019-01-08 | Stop reason: SURG

## 2019-01-08 RX ORDER — ONDANSETRON 2 MG/ML
4 INJECTION INTRAMUSCULAR; INTRAVENOUS ONCE AS NEEDED
Status: DISCONTINUED | OUTPATIENT
Start: 2019-01-08 | End: 2019-01-08 | Stop reason: HOSPADM

## 2019-01-08 RX ORDER — LIDOCAINE HYDROCHLORIDE 10 MG/ML
INJECTION, SOLUTION EPIDURAL; INFILTRATION; INTRACAUDAL; PERINEURAL AS NEEDED
Status: DISCONTINUED | OUTPATIENT
Start: 2019-01-08 | End: 2019-01-08 | Stop reason: HOSPADM

## 2019-01-08 RX ORDER — ONDANSETRON 2 MG/ML
4 INJECTION INTRAMUSCULAR; INTRAVENOUS EVERY 6 HOURS PRN
Status: DISCONTINUED | OUTPATIENT
Start: 2019-01-08 | End: 2019-01-09 | Stop reason: HOSPADM

## 2019-01-08 RX ORDER — SODIUM CHLORIDE, SODIUM LACTATE, POTASSIUM CHLORIDE, CALCIUM CHLORIDE 600; 310; 30; 20 MG/100ML; MG/100ML; MG/100ML; MG/100ML
75 INJECTION, SOLUTION INTRAVENOUS CONTINUOUS
Status: DISCONTINUED | OUTPATIENT
Start: 2019-01-08 | End: 2019-01-09 | Stop reason: HOSPADM

## 2019-01-08 RX ORDER — VANCOMYCIN HYDROCHLORIDE 1 G/200ML
1000 INJECTION, SOLUTION INTRAVENOUS EVERY 12 HOURS
Status: COMPLETED | OUTPATIENT
Start: 2019-01-09 | End: 2019-01-09

## 2019-01-08 RX ORDER — OXYCODONE HYDROCHLORIDE AND ACETAMINOPHEN 5; 325 MG/1; MG/1
1 TABLET ORAL EVERY 4 HOURS PRN
Status: DISCONTINUED | OUTPATIENT
Start: 2019-01-08 | End: 2019-01-09 | Stop reason: HOSPADM

## 2019-01-08 RX ORDER — PROPRANOLOL HYDROCHLORIDE 20 MG/1
20 TABLET ORAL EVERY EVENING
Status: DISCONTINUED | OUTPATIENT
Start: 2019-01-08 | End: 2019-01-09 | Stop reason: HOSPADM

## 2019-01-08 RX ORDER — ONDANSETRON 2 MG/ML
INJECTION INTRAMUSCULAR; INTRAVENOUS AS NEEDED
Status: DISCONTINUED | OUTPATIENT
Start: 2019-01-08 | End: 2019-01-08 | Stop reason: SURG

## 2019-01-08 RX ADMIN — NITROFURANTOIN MONOHYDRATE/MACROCRYSTALLINE 100 MG: 25; 75 CAPSULE ORAL at 17:32

## 2019-01-08 RX ADMIN — GABAPENTIN 100 MG: 100 CAPSULE ORAL at 17:18

## 2019-01-08 RX ADMIN — ONDANSETRON 4 MG: 2 INJECTION INTRAMUSCULAR; INTRAVENOUS at 14:13

## 2019-01-08 RX ADMIN — PROPOFOL 40 MG: 10 INJECTION, EMULSION INTRAVENOUS at 13:45

## 2019-01-08 RX ADMIN — SODIUM CHLORIDE 125 ML/HR: 0.9 INJECTION, SOLUTION INTRAVENOUS at 12:02

## 2019-01-08 RX ADMIN — SODIUM CHLORIDE, SODIUM LACTATE, POTASSIUM CHLORIDE, AND CALCIUM CHLORIDE 75 ML/HR: .6; .31; .03; .02 INJECTION, SOLUTION INTRAVENOUS at 16:18

## 2019-01-08 RX ADMIN — TAMSULOSIN HYDROCHLORIDE 0.4 MG: 0.4 CAPSULE ORAL at 17:18

## 2019-01-08 RX ADMIN — OXYBUTYNIN CHLORIDE 5 MG: 5 TABLET ORAL at 17:18

## 2019-01-08 RX ADMIN — FENTANYL CITRATE 25 MCG: 50 INJECTION, SOLUTION INTRAMUSCULAR; INTRAVENOUS at 13:45

## 2019-01-08 RX ADMIN — VANCOMYCIN HYDROCHLORIDE 1000 MG: 1 INJECTION, SOLUTION INTRAVENOUS at 23:05

## 2019-01-08 RX ADMIN — VANCOMYCIN HYDROCHLORIDE 1000 MG: 1 INJECTION, SOLUTION INTRAVENOUS at 12:02

## 2019-01-08 RX ADMIN — PROPOFOL 160 MG: 10 INJECTION, EMULSION INTRAVENOUS at 13:42

## 2019-01-08 RX ADMIN — PROPRANOLOL HYDROCHLORIDE 20 MG: 20 TABLET ORAL at 17:18

## 2019-01-08 RX ADMIN — HEPARIN SODIUM 5000 UNITS: 5000 INJECTION INTRAVENOUS; SUBCUTANEOUS at 21:02

## 2019-01-08 RX ADMIN — FENTANYL CITRATE 25 MCG: 50 INJECTION, SOLUTION INTRAMUSCULAR; INTRAVENOUS at 14:26

## 2019-01-08 RX ADMIN — EPHEDRINE SULFATE 5 MG: 50 INJECTION, SOLUTION INTRAMUSCULAR; INTRAVENOUS; SUBCUTANEOUS at 14:06

## 2019-01-08 RX ADMIN — FENTANYL CITRATE 50 MCG: 50 INJECTION, SOLUTION INTRAMUSCULAR; INTRAVENOUS at 13:42

## 2019-01-08 RX ADMIN — SODIUM CHLORIDE: 0.9 INJECTION, SOLUTION INTRAVENOUS at 14:25

## 2019-01-08 NOTE — OP NOTE
OPERATIVE REPORT  PATIENT NAME: Pranav Powell    :  1935  MRN: [id number]  Pt Location: [facility] OR ROOM 02    SURGERY DATE: 2019    Surgeon(s) and Role:     * Diann Zarco MD - Primary    Preop Diagnosis:  Enlarged prostate with urinary obstruction [N40 1, N13 8]    Post-Op Diagnosis Codes:     * Enlarged prostate with urinary obstruction [N40 1, N13 8]    Procedure(s) (LRB):  CYSTOSCOPY, TRANSURETHRAL RESECTION OF PROSTATE (TURP) (N/A)  INSERTION SUPRAPUBIC CATHETER PERCUTANEOUS (N/A)    Specimen(s):  ID Type Source Tests Collected by Time Destination   1 : prostate tissue Tissue Prostate TISSUE EXAM Diann Zarco MD 2019 [id number]        Estimated Blood Loss:   100 mL    Drains:  Urethral Catheter (Active)   Number of days:        Urethral Catheter Coude (Active)   Number of days: 67       Urethral Catheter (Active)   Number of days:        Suprapubic Catheter 18 Fr  (Active)   Site Assessment SONIA 2019  3:56 PM   Dressing Status New drainage 2019  3:56 PM   Dressing Type Dry dressing 2019  3:23 PM   Collection Container Capped 2019  3:23 PM   Number of days: 0       Continuous Bladder Irrigation Three-way (Active)   Site Assessment Clean;Skin intact 2019  3:23 PM   Securement Method Tape 2019  3:23 PM   Rate Fast 2019  3:23 PM   CBI Irrigation Intake (mL) 750 mL 2019  3:56 PM   CBI Valentine Output (mL) 850 mL 2019  3:56 PM   CBI Net Output (mL) 100 mL 2019  3:56 PM   Number of days: 0       Anesthesia Type:   General    Operative Indications:  Enlarged prostate with urinary obstruction [N40 1, N13 8]  Urgency incont    Operative Findings:  Large prostate    Complications:   None    Procedure and Technique:      ? The patient was brought into the OR, properly identified and positioned on the table  general anesthesia was induced, and he was prepped using betadine solution and draped in lithotomy position      The 26 Swedish resectoscope sheath was introduced, and inspection confirmed hyperplasia of the prostate and secondary obstructive changes in the bladder  The suprapubic tube was performed first  After distending the bladder to ensure that the peritoneal contents were moved superiorly and not hit by the needle or catheter, a small incision was made on the SP space, and taken down to incise the fascia  A needle was placed thru the incision, thru the anterior abdominal wall, and into the bladder on the anterior wall  Care was taken to ensure the needle did not hit the peritoneal contents  Thru the needle a wire was placed  Over the wire a fascial dilator and then a trocar were placed  Thru the trocar a 16 F sifuentes was placed  It was confirmed by the scope to be in good position, and it was sutured to the skin  The TURP was then performed  Prostate tissue was resected circumferentially from the bladder neck out to the apex, down to intermittent capsular fibers  There were no capsular perforations noted  Pieces of tissue were evacuated using the Urovac bottle type evacuator  Hemostasis was obtained using electrocautery  Final inspection revealed no damage to the ureteral orifices, well resected prostate, no significant bleeding, no retained pieces of tissue, and no damage to the sphincter  The scope was removed, a 25 F hematuria catheter was inserted, irrigated clear, and hooked up to gravity drainage   The patient left the OR in good condition   I was present for the entire procedure    Patient Disposition:  PACU     SIGNATURE: Lan Markham MD  DATE: January 8, 2019  TIME: 4:07 PM

## 2019-01-08 NOTE — H&P
HISTORY AND PHYSICAL  ? ? Patient Name: Mendel Galeana  Patient MRN: [id number]  Attending Provider: Vincent Escoto MD  Service: Urology  Chief Complaint    BPH,  Chronic retention  HPI   Mendel Galeana is a 80 y o  male with long term BPH, retention over 1000 ml, no response to medical therapy  I plan TURP, SP placement  Pt knows he may have long term retention despite relieving the bladder outlet obstruction  Potential risks and complications discussed, and informed consent was given by the patient  Medications  Meds/Allergies     No current facility-administered medications for this encounter  Cannot display prior to admission medications because the patient has not been admitted in this contact  No current facility-administered medications for this encounter       Current Outpatient Prescriptions:     ascorbic acid (VITAMIN C) 250 mg tablet, Take 500 mg by mouth daily, Disp: , Rfl:     aspirin (ECOTRIN LOW STRENGTH) 81 mg EC tablet, Take 81 mg by mouth every evening  , Disp: , Rfl:     atorvastatin (LIPITOR) 20 mg tablet, Take 20 mg by mouth daily, Disp: , Rfl:     Cholecalciferol (VITAMIN D) 2000 units CAPS, Take by mouth, Disp: , Rfl:     Coenzyme Q10 (COQ10) 200 MG CAPS, Take by mouth daily, Disp: , Rfl:     cyanocobalamin 100 MCG tablet, Take 100 mcg by mouth daily, Disp: , Rfl:     finasteride (PROSCAR) 5 mg tablet, Take 5 mg by mouth daily, Disp: , Rfl:     gabapentin (NEURONTIN) 100 mg capsule, Take 100 mg by mouth 2 (two) times a day, Disp: , Rfl:     Omega-3 Fatty Acids (FISH OIL OMEGA-3 PO), Take by mouth daily 1 tsp , Disp: , Rfl:     propranolol (INDERAL) 20 mg tablet, Take 20 mg by mouth every evening  , Disp: , Rfl:     propranolol (INDERAL) 60 mg tablet, Take 60 mg by mouth every morning  , Disp: , Rfl:     tamsulosin (FLOMAX) 0 4 mg, Take 0 4 mg by mouth daily with dinner, Disp: , Rfl:     nitrofurantoin (MACROBID) 100 mg capsule, Take 1 capsule (100 mg total) by mouth 2 (two) times a day, Disp: 28 capsule, Rfl: 0  Review of Systems  10 point review of systems negative except as noted in HPI  Allergies  Allergies   Allergen Reactions    Cefuroxime Hives    Cephalosporins     Other      Dust   Molds  Adhesive Tape,,,caused red dots     PMH  Past Medical History:   Diagnosis Date    Back disorder     BPH with urinary obstruction     Bruise     right upper arm    Cancer (HCC)     cheekskin cancer    Cataract     Chronic pain disorder     Neck and knee    Elevated PSA     Essential tremor     History of total right hip replacement 2013    Hyperlipidemia     Hypertension     Indwelling Valentine catheter present     Irregular heartbeat     Knee pain, bilateral     raymond doing stairs    Muscle weakness     uses cane and wheelchair for distance    Nasal congestion     Prostate nodule     Risk for falls     Shortness of breath     "extreme exercise"    Swollen ankles     Teeth missing     Tremor     Wears glasses      Past surgical history  Past Surgical History:   Procedure Laterality Date    COLONOSCOPY      CYSTOSCOPY      HIP SURGERY      JOINT REPLACEMENT Right     hip    LUMBAR EPIDURAL INJECTION      SKIN BIOPSY      TONSILECTOMY AND ADNOIDECTOMY      US GUIDED PROSTATE BIOPSY Bilateral 2003     Social history  Social History   Substance Use Topics    Smoking status: Never Smoker    Smokeless tobacco: Never Used    Alcohol use Yes      Comment: Drinks socially  ? Physical Exam  General appearance: alert and oriented, in no acute distress  Head: Normocephalic, without obvious abnormality, atraumatic  Back: symmetric, no curvature  ROM normal  No CVA tenderness    Lungs: clear to auscultation bilaterally  Chest wall: no tenderness  Heart: regular rate and rhythm, S1, S2 normal, no murmur, click, rub or gallop  Abdomen: soft, non-tender; bowel sounds normal; no masses,  no organomegaly  Male genitalia: normal  Extremities: extremities normal, warm and well-perfused; no cyanosis, clubbing, or edema  Neurologic: Grossly normal  Daiana Sanchez MD

## 2019-01-09 ENCOUNTER — TELEPHONE (OUTPATIENT)
Dept: SURGERY | Facility: HOSPITAL | Age: 84
End: 2019-01-09

## 2019-01-09 VITALS
TEMPERATURE: 97.2 F | BODY MASS INDEX: 29.69 KG/M2 | WEIGHT: 224 LBS | SYSTOLIC BLOOD PRESSURE: 191 MMHG | RESPIRATION RATE: 20 BRPM | HEIGHT: 73 IN | HEART RATE: 61 BPM | DIASTOLIC BLOOD PRESSURE: 86 MMHG | OXYGEN SATURATION: 99 %

## 2019-01-09 PROCEDURE — 99024 POSTOP FOLLOW-UP VISIT: CPT | Performed by: NURSE PRACTITIONER

## 2019-01-09 RX ADMIN — PROPRANOLOL HYDROCHLORIDE 60 MG: 20 TABLET ORAL at 08:42

## 2019-01-09 RX ADMIN — SODIUM CHLORIDE, SODIUM LACTATE, POTASSIUM CHLORIDE, AND CALCIUM CHLORIDE 75 ML/HR: .6; .31; .03; .02 INJECTION, SOLUTION INTRAVENOUS at 05:28

## 2019-01-09 RX ADMIN — VITAMIN B12 0.1 MG ORAL TABLET 100 MCG: 0.1 TABLET ORAL at 08:42

## 2019-01-09 RX ADMIN — NITROFURANTOIN MONOHYDRATE/MACROCRYSTALLINE 100 MG: 25; 75 CAPSULE ORAL at 08:42

## 2019-01-09 RX ADMIN — HEPARIN SODIUM 5000 UNITS: 5000 INJECTION INTRAVENOUS; SUBCUTANEOUS at 05:27

## 2019-01-09 RX ADMIN — FINASTERIDE 5 MG: 5 TABLET, FILM COATED ORAL at 08:42

## 2019-01-09 RX ADMIN — OXYCODONE HYDROCHLORIDE AND ACETAMINOPHEN 500 MG: 500 TABLET ORAL at 08:42

## 2019-01-09 RX ADMIN — GABAPENTIN 100 MG: 100 CAPSULE ORAL at 08:42

## 2019-01-09 RX ADMIN — OXYBUTYNIN CHLORIDE 5 MG: 5 TABLET ORAL at 08:42

## 2019-01-09 RX ADMIN — VANCOMYCIN HYDROCHLORIDE 1000 MG: 1 INJECTION, SOLUTION INTRAVENOUS at 11:56

## 2019-01-09 RX ADMIN — Medication 200 MG: at 08:41

## 2019-01-09 NOTE — SOCIAL WORK
Patient admitted as an ambulatory surgery undergoing TURP and SPC placement (tube capped)  Has sifuentes cath in place with [facility] (already open to service for SN) to manage  Requested RN educate on sifuentes care and changing from overnight bag to leg bag- same to be done prior to d/c  Pt lives with wife in a ranch style house and is independent with all aspects of care  Ambulates with RW without difficulty and continues to drive- states recently has not been doing so with his daughter providing transport when needed  Pt uses [facility] for prescriptions  No questions/concerns voiced with pt being cleared for d/c this day  [facility] updated on needs with F2F completed by attending  No further d/c needs identified

## 2019-01-09 NOTE — TELEPHONE ENCOUNTER
Patient will be discharged this afternoon  He is postop day 1  For TURP with Dr [place]  Please arrange nurse visit on Monday for Valentine catheter removal   Patient being discharged with suprapubic catheter which is currently clamped and taped to the abdomen

## 2019-01-09 NOTE — PLAN OF CARE

## 2019-01-09 NOTE — TELEPHONE ENCOUNTER
Appt 1/11 at 8:30 for Nurse Visit sifuentes removal  Pt to be notified  Pt has not been discharged yet   Will clarify with Dr Beverley Wadsworth date of removal

## 2019-01-09 NOTE — DISCHARGE SUMMARY
Discharge Summary - Nakia Vasquez 80 y o  male MRN: [id number]    Unit/Bed#: [facility] [address] 209-01 Encounter: [id number]    Admission Date:  01/08/2019    Admitting Diagnosis: Enlarged prostate with urinary obstruction [N40 1, N13 8]    HPI:      Procedures Performed:  Cystoscopy, transurethral resection of prostate, insertion of suprapubic catheter    Summary of Hospital Course: This is an 51-year-old male patient with a history of benign prostatic hyperplasia with urinary retention  Retention amount greater than a Liter and not responsive to medical therapy  He has past medical history significant for benign prostatic hyperplasia with urinary obstruction and  essential tremor  After failed medical times conversation ensued between patient in Dr Abiodun Mendoza and the decision was agreed upon for transurethral resection of prostate and insertion of suprapubic Valentine catheter  According to the operative note, the surgical procedure occurred as planned with estimated blood loss 100 mL  The patient has a 16 [place] suprapubic Valentine catheter inserted and sutured into place  Dressing dry and intact  A 22 [place] Valentine catheter was inserted through his penile urethra and connected to continuous bladder irrigation postoperatively  He he was taken to PACU where he recovered suspected was transferred to his surgical floor for further evaluation and recovery  Patient had a uneventful night  Continuous bladder irrigation ensued overnight and through the morning  Urine color this morning light pink, no clots noted  Patient with suprapubic catheter dressing dry and intact  Tolerating p o  With no problem denies nausea, vomiting, pain  Continuous bladder irrigation discontinued to monitor urine output in preparation of patient discharged home      Significant Findings, Care, Treatment and Services Provided: pathology pending    Complications:  None,  mL    Discharge Diagnosis:  Benign prostatic hyperplasia with urinary obstruction    Resolved Problems  Date Reviewed: 10/17/2018    None          Condition at Discharge: good         Discharge instructions/Information to patient and family:   See after visit summary for information provided to patient and family  Provisions for Follow-Up Care:  See after visit summary for information related to follow-up care and any pertinent home health orders  PCP: Jaqueline Villalba MD    Disposition: Home    Planned Readmission: No    Discharge Statement   I spent 30 minutes discharging the patient  This time was spent on the day of discharge  I had direct contact with the patient on the day of discharge  Additional documentation is required if more than 30 minutes were spent on discharge  Discharge Medications:  See after visit summary for reconciled discharge medications provided to patient and family  Benign prostatic hyperplasia  · Continuous bladder irrigation overnight  Urine culture this morning light pink, no clots noted  Patient denies pain, nausea, vomiting  · Clamp CBI this a m  And monitor urine output  · In preparation of patient discharged home, nursing to provide education regarding changing his Valentine catheter to leg bag and vice versa  · Patient to be discharged home with Valentine catheter in place, return to office on 01/14/2018 for removal of Valentine catheter  Patient discharged home with suprapubic Valentine catheter  · Answer questions regarding discharge, meds at discharge and follow-up appointment to patient's satisfaction  /96 (BP Location: Left arm)   Pulse 63   Temp 98 4 °F (36 9 °C) (Tympanic)   Resp 18   Ht 6' 1" (1 854 m)   Wt 102 kg (224 lb)   SpO2 98%   BMI 29 55 kg/m²   General appearance: alert and oriented, in no acute distress  Head: Normocephalic, without obvious abnormality, atraumatic  Back: symmetric, no curvature  ROM normal  No CVA tenderness     Lungs: clear to auscultation bilaterally  Heart: regular rate and rhythm, S1, S2 normal, no murmur, click, rub or gallop  Abdomen: Soft nontender, nondistended  Normoactive bowel sounds  Male genitalia:  Three way Valentine catheter in place; suprapubic catheter in place, capped  Extremities: Patient with the essential tremors  Skin: Skin color, texture, turgor normal  No rashes or lesions  Neurologic:  Awake alert and oriented x3    REGGIE Cooper

## 2019-01-09 NOTE — PLAN OF CARE
INFECTION - ADULT     Absence or prevention of progression during hospitalization Completed     Absence of fever/infection during neutropenic period Completed        Knowledge Deficit     Patient/family/caregiver demonstrates understanding of disease process, treatment plan, medications, and discharge instructions Completed        PAIN - ADULT     Verbalizes/displays adequate comfort level or baseline comfort level Completed        Potential for Falls     Patient will remain free of falls Completed        Prexisting or High Potential for Compromised Skin Integrity     Skin integrity is maintained or improved Completed        SAFETY ADULT     Maintain or return to baseline ADL function Completed     Maintain or return mobility status to optimal level Completed

## 2019-01-09 NOTE — NURSING NOTE
Pt given discharge instructions  Denied any questions  IV pulled  Pt given sifuentes catheter teaching and supplies  Denied any questions

## 2019-01-09 NOTE — DISCHARGE INSTRUCTIONS
Keep Valentine catheter in place in penis until follow appointment on Monday 1-14-19  Will be removed by nurse in the office  Keep suprapubic catheter clamped  Resume medications as pre-hospital  Called the office for any concerning symptoms such as fever, chills, uncontrolled pain, inability urinate, worsening bloody urine  Benign Prostatic Hypertrophy   WHAT YOU NEED TO KNOW:   Benign prostatic hypertrophy (BPH) is a condition that causes your prostate gland to grow larger than normal  The prostate gland is the male sex gland that produces a fluid that is part of semen  It is about the size of a walnut and it is located under the bladder  As the prostate grows, it can squeeze the urethra  This can block urine flow and cause urinary problems  DISCHARGE INSTRUCTIONS:   Medicines:   · Alpha blockers: This medicine relaxes the muscles in your prostate and bladder  It may help you urinate more easily  · 5 alpha reductase inhibitors: These medicines block the production of a hormone that causes the prostate to get larger  It may help slow the growth of the prostate or shrink the prostate  · Take your medicine as directed  Contact your healthcare provider if you think your medicine is not helping or if you have side effects  Tell him or her if you are allergic to any medicine  Keep a list of the medicines, vitamins, and herbs you take  Include the amounts, and when and why you take them  Bring the list or the pill bottles to follow-up visits  Carry your medicine list with you in case of an emergency  Follow up with your healthcare provider as directed:  Write down your questions so you remember to ask them during your visits  Manage BPH:   · Do not let your bladder get too full before you empty it  Urinate when you feel the urge  · Limit alcohol  Do not drink large amounts of any liquid at one time  · Decrease the amount of salt you eat   Examples of salty foods are chips, cured meats, and canned soups  Do not use table salt  · Healthcare providers may tell you not to eat spicy foods such as chilli peppers  This may help you find out if spicy food makes your BPH symptoms worse  · You may have sex if you feel well  Contact your healthcare provider if:   · There is a large amount of blood in your urine  · Your signs and symptoms get worse  · You have a fever  · You have questions or concerns about your condition or care  Seek care immediately if:   · You are unable to urinate  · Your bladder feels very full and painful  © 2017 [employer]  Information is for End User's use only and may not be sold, redistributed or otherwise used for commercial purposes  All illustrations and images included in CareNotes® are the copyrighted property of A D A M , Inc  or [employer]  The above information is an  only  It is not intended as medical advice for individual conditions or treatments  Talk to your doctor, nurse or pharmacist before following any medical regimen to see if it is safe and effective for you  Transurethral Prostatectomy   WHAT YOU NEED TO KNOW:   A transurethral prostatectomy is surgery that is done to remove part or all of your prostate gland  This surgery is also called transurethral resection of the prostate (TURP)  DISCHARGE INSTRUCTIONS:   Medicines:   · Pain medicine: You may be given a prescription medicine to decrease pain  Do not wait until the pain is severe before you take this medicine  · Antibiotics: This medicine is given to fight or prevent an infection caused by bacteria  Always take your antibiotics exactly as ordered by your healthcare provider  Do not stop taking your medicine unless directed by your healthcare provider  Never save antibiotics or take leftover antibiotics that were given to you for another illness  · Take your medicine as directed    Contact your healthcare provider if you think your medicine is not helping or if you have side effects  Tell him or her if you are allergic to any medicine  Keep a list of the medicines, vitamins, and herbs you take  Include the amounts, and when and why you take them  Bring the list or the pill bottles to follow-up visits  Carry your medicine list with you in case of an emergency  Follow up with your healthcare provider or urologist as directed: You may need to return to make sure you do not have an infection, or to have your Valentine catheter removed  Write down your questions so you remember to ask them during your visits  Valentine catheter care: A Valentine catheter is a tube put into your bladder to drain your urine into a bag  Keep the bag of urine well below your waist  Lifting the urine bag higher will make the urine flow back into your bladder, which can cause an infection  Do not pull on the catheter  This may cause pain and bleeding, and the catheter may come out  Do not let the catheter tubing kink, because this will block the flow of urine  Ask for more information about how to care for yourself when you have a Valentine catheter in place  Bladder control:  After surgery, you may leak urine and have trouble controlling when you urinate  Ask for more information about the following ways to help decrease urine leakage:  · Avoid caffeine:  Caffeine can cause problems with bladder control and increase your need to urinate  · Do pelvic floor muscle exercises:  Pelvic floor muscle exercises may help improve your bladder control, if you leak urine  These exercises are done by tightening and relaxing your pelvic muscles  Ask how to do pelvic floor muscle exercises, and how often to do them  · Limit your liquids:  Drink smaller amounts of liquid throughout the day  Do not drink before bedtime  Ask if you should decrease the amount of liquid you drink each day  This may help you control your bladder  · Wear a pad or adult diapers:   These may help to absorb leaking urine and decrease the odor  Activity guidelines:  Ask when it is okay for you to return to work and activities, or to have sex  Contact your healthcare provider or urologist if:   · You have a fever  · You have new or more blood in your urine  · You have trouble starting to urinate, or have a weak stream of urine when you urinate  · You feel like you have a full bladder, even after you urinate  You may also leak urine  · You often wake up during the night to urinate  You may also feel the need to urinate right away  · You feel pain and burning when you urinate  · You feel pain or pressure in your lower abdomen  · Your urine looks cloudy, and smells bad  · You have trouble getting an erection or ejaculating  · You have questions or concerns about your condition or care  Seek care immediately or call 911 if:   · You urinate little or not at all  · You have severe abdominal or back pain  · You are dizzy or confused  · You have abdominal pain, nausea, and vomiting  · Your heartbeat is slower than usual   © 2017 [address] [employer]  Information is for End User's use only and may not be sold, redistributed or otherwise used for commercial purposes  All illustrations and images included in CareNotes® are the copyrighted property of [employer] Inc  or Jessee Dickson  The above information is an  only  It is not intended as medical advice for individual conditions or treatments  Talk to your doctor, nurse or pharmacist before following any medical regimen to see if it is safe and effective for you

## 2019-01-14 ENCOUNTER — CLINICAL SUPPORT (OUTPATIENT)
Dept: UROLOGY | Facility: MEDICAL CENTER | Age: 84
End: 2019-01-14

## 2019-01-14 VITALS
SYSTOLIC BLOOD PRESSURE: 142 MMHG | HEART RATE: 54 BPM | HEIGHT: 73 IN | DIASTOLIC BLOOD PRESSURE: 80 MMHG | BODY MASS INDEX: 29.55 KG/M2 | WEIGHT: 223 LBS

## 2019-01-14 DIAGNOSIS — N13.8 BPH WITH URINARY OBSTRUCTION: Primary | ICD-10-CM

## 2019-01-14 DIAGNOSIS — R33.9 URINARY RETENTION WITH INCOMPLETE BLADDER EMPTYING: ICD-10-CM

## 2019-01-14 DIAGNOSIS — N40.1 BPH WITH URINARY OBSTRUCTION: Primary | ICD-10-CM

## 2019-01-14 PROCEDURE — 99024 POSTOP FOLLOW-UP VISIT: CPT

## 2019-01-14 NOTE — PROGRESS NOTES
Patient returns for Valentine removal S/P TURP  Urine in bag is light blood tinged without clots  Valentine removed without difficulty, patient tolerated well  S/P tube site was cleaned with peroxide and dressed with split DSD  Written and verbal instructions were given to pt and his wife on care and unplugging/ plugging S/P tube  They will record PVR's and bring to the appt  in 2 weeks  Denies fever or urinary symptoms  Patient instructed to increase fluids, especially water,  and limit caffeine intake  Call office if any questions or concerns or has increased discomfort

## 2019-01-28 ENCOUNTER — TELEPHONE (OUTPATIENT)
Dept: UROLOGY | Facility: AMBULATORY SURGERY CENTER | Age: 84
End: 2019-01-28

## 2019-01-28 NOTE — TELEPHONE ENCOUNTER
With the current residuals order was given to decertify for home care  Will discuss further at pt's follow up 2/6/19

## 2019-01-28 NOTE — TELEPHONE ENCOUNTER
[facility] visiting nurse has questions regarding patient suprapubic catheter  Wants to know if on his next appointment it will be removed  His residual is 500 to 600 milliliters  2 12 hours  Needs to know if he will be continuing with home care if so needs to have recertification  Please advise

## 2019-02-06 ENCOUNTER — OFFICE VISIT (OUTPATIENT)
Dept: UROLOGY | Facility: MEDICAL CENTER | Age: 84
End: 2019-02-06

## 2019-02-06 VITALS
HEART RATE: 72 BPM | HEIGHT: 73 IN | DIASTOLIC BLOOD PRESSURE: 80 MMHG | BODY MASS INDEX: 29.42 KG/M2 | SYSTOLIC BLOOD PRESSURE: 130 MMHG

## 2019-02-06 DIAGNOSIS — N40.1 BPH WITH URINARY OBSTRUCTION: Primary | ICD-10-CM

## 2019-02-06 DIAGNOSIS — N13.8 BPH WITH URINARY OBSTRUCTION: Primary | ICD-10-CM

## 2019-02-06 PROCEDURE — 99024 POSTOP FOLLOW-UP VISIT: CPT | Performed by: UROLOGY

## 2019-02-06 NOTE — PROGRESS NOTES
Now five weeks out from TURP and SP tube placement  30 g BP H  He had chronic retention for many months before this  They are checking residuals twice per day now  Last week it was 600 or more, this past week it has been 400-500  Plan:  Open SP tube only once per day, just before bed  Record that PVR, does not have to record how much he voids  If residuals  around 300 or less, I think that is as good as we will get, and I would remove the tube at that time  If any question, let me know when patient is here for that visit two weeks from now

## 2019-02-06 NOTE — LETTER
February 6, 2019     Gleda Sever, MD  [address]    Patient: Nakia Vasquez   YOB: 1935   Date of Visit: 2/6/2019       Dear Dr Odessa Person: Thank you for referring Nakia Vasquez to me for evaluation  Below are my notes for this consultation  If you have questions, please do not hesitate to call me  I look forward to following your patient along with you  Sincerely,        Daiana Sanchez MD        CC: No Recipients  Daiana Sanchez MD  2/6/2019 12:11 PM  Sign at close encounter  Now five weeks out from TURP and SP tube placement  30 g BP H  He had chronic retention for many months before this  They are checking residuals twice per day now  Last week it was 600 or more, this past week it has been 400-500  Plan:  Open SP tube only once per day, just before bed  Record that PVR, does not have to record how much he voids  If residuals  around 300 or less, I think that is as good as we will get, and I would remove the tube at that time  If any question, let me know when patient is here for that visit two weeks from now

## 2019-02-21 ENCOUNTER — OFFICE VISIT (OUTPATIENT)
Dept: UROLOGY | Facility: MEDICAL CENTER | Age: 84
End: 2019-02-21
Payer: MEDICARE

## 2019-02-21 VITALS
SYSTOLIC BLOOD PRESSURE: 144 MMHG | HEIGHT: 73 IN | HEART RATE: 52 BPM | BODY MASS INDEX: 29.55 KG/M2 | DIASTOLIC BLOOD PRESSURE: 80 MMHG | WEIGHT: 223 LBS

## 2019-02-21 DIAGNOSIS — N13.8 BPH WITH URINARY OBSTRUCTION: Primary | ICD-10-CM

## 2019-02-21 DIAGNOSIS — N40.1 BPH WITH URINARY OBSTRUCTION: Primary | ICD-10-CM

## 2019-02-21 DIAGNOSIS — N13.30 HYDRONEPHROSIS, UNSPECIFIED HYDRONEPHROSIS TYPE: ICD-10-CM

## 2019-02-21 LAB
POST-VOID RESIDUAL VOLUME, ML POC: 351 ML
SL AMB  POCT GLUCOSE, UA: ABNORMAL
SL AMB LEUKOCYTE ESTERASE,UA: ABNORMAL
SL AMB POCT BILIRUBIN,UA: ABNORMAL
SL AMB POCT BLOOD,UA: ABNORMAL
SL AMB POCT CLARITY,UA: CLEAR
SL AMB POCT COLOR,UA: YELLOW
SL AMB POCT KETONES,UA: ABNORMAL
SL AMB POCT NITRITE,UA: POSITIVE
SL AMB POCT PH,UA: 6
SL AMB POCT SPECIFIC GRAVITY,UA: 1.01
SL AMB POCT URINE PROTEIN: ABNORMAL
SL AMB POCT UROBILINOGEN: 0.2

## 2019-02-21 PROCEDURE — 51798 US URINE CAPACITY MEASURE: CPT | Performed by: NURSE PRACTITIONER

## 2019-02-21 PROCEDURE — 81003 URINALYSIS AUTO W/O SCOPE: CPT | Performed by: NURSE PRACTITIONER

## 2019-02-21 PROCEDURE — 99024 POSTOP FOLLOW-UP VISIT: CPT | Performed by: NURSE PRACTITIONER

## 2019-02-21 NOTE — PROGRESS NOTES
2/21/2019    Patient Active Problem List   Diagnosis    Acute urinary retention    Acute renal failure superimposed on stage 3 chronic kidney disease (HCC)    Essential tremor    Other hyperlipidemia    Other hydronephrosis    Renal insufficiency    Urinary retention with incomplete bladder emptying    BPH with urinary obstruction    Enlarged prostate with urinary obstruction    Penile erosion    Acute cystitis without hematuria     Assessment and Plan    80 y o  male managed by Dr Pj Villa  1  Chronic urinary retention  · Status post TURP insertion of suprapubic Valentine catheter 01/08/2019  · Patient with decreasing amount of residuals since postop phase  According to previous office visit, we will check residuals daily  If residuals are less than 300 mL consider removal of SP tube at that time  · Upon review of patient voiding diary patient denies document the amount of urine output reduced urethrally a to document the daily catheter output which ranged between 400 and 550 mL of urine  · Discussed results with Dr Pj Villa, all of patient return to the office in 2 weeks with document results of urine production that is voided and a daily output that is measured from the SP tube  · Ultrasound of kidney and bladder ordered to evaluate for hydronephrosis or resolution of such  History of Present Illness  Chidi Montes is a 80 y o  male here for follow up evaluation of  Chronic urinary retention  Pt with past medical history significant for benign prostatic hyperplasia with urinary retention  Patient is 7 weeks status post TURP and insertion of SP tube  Postop patient has been checking his bladder residuals 2 times a day  With decreasing residual amount  Plan, per Dr Pj Villa, on last office visit was to open SPT to gravity daily and record PVR  If PVR is less than 300, will consider removal of SPT  Patient denies dysuria, hematuria, urinary frequency and urgency    Denies difficulties with SP tube drainage  Patient denies fever and chills    Review of Systems   Constitutional: Negative for chills and fever  Respiratory: Negative for cough and shortness of breath  Cardiovascular: Negative for chest pain  Gastrointestinal: Negative for abdominal distention, abdominal pain, blood in stool, nausea and vomiting  Genitourinary: Negative for difficulty urinating, dysuria, enuresis, flank pain, frequency, hematuria and urgency  SP tube in place   Skin: Negative for rash  Neurological: Positive for tremors  Past Medical History  Past Medical History:   Diagnosis Date    Back disorder     BPH with urinary obstruction     Bruise     right upper arm    Cancer (HCC)     cheekskin cancer    Cataract     Chronic pain disorder     Neck and knee    Elevated PSA     Essential tremor     History of total right hip replacement 2013    Hyperlipidemia     Hypertension     Indwelling Valentine catheter present     Irregular heartbeat     Knee pain, bilateral     raymond doing stairs    Muscle weakness     uses cane and wheelchair for distance    Nasal congestion     Prostate nodule     Risk for falls     Shortness of breath     "extreme exercise"    Swollen ankles     Teeth missing     Tremor     Wears glasses        Past Social History  Past Surgical History:   Procedure Laterality Date    COLONOSCOPY      CYSTOSCOPY      HIP SURGERY      JOINT REPLACEMENT Right     hip    LUMBAR EPIDURAL INJECTION      DC TRANSURETHRAL ELEC-SURG PROSTATECTOM N/A 1/8/2019    Procedure: CYSTOSCOPY, TRANSURETHRAL RESECTION OF PROSTATE (TURP);   Surgeon: Bina Myers MD;  Location[facility];  Service: Urology    SKIN BIOPSY      SUPRAPUBIC TUBE PLACEMENT N/A 1/8/2019    Procedure: INSERTION SUPRAPUBIC CATHETER PERCUTANEOUS;  Surgeon: Bina Myers MD;  Location[facility];  Service: Urology    TONSILECTOMY AND ADNOIDECTOMY     [address] [place] BIOPSY Bilateral 2003     Social History     Tobacco Use   Smoking Status Never Smoker   Smokeless Tobacco Never Used       Past Family History  Family History   Problem Relation Age of Onset    Heart disease Mother     Heart disease Father     Cancer Family     Heart disease Family        Past Social history  Social History     Socioeconomic History    Marital status: /Civil Union     Spouse name: Not on file    Number of children: Not on file    Years of education: Not on file    Highest education level: Not on file   Occupational History    Occupation:    food industry   Social Needs    Financial resource strain: Not on file    Food insecurity:     Worry: Not on file     Inability: Not on file    Transportation needs:     Medical: Not on file     Non-medical: Not on file   Tobacco Use    Smoking status: Never Smoker    Smokeless tobacco: Never Used   Substance and Sexual Activity    Alcohol use: Yes     Alcohol/week: 0 6 oz     Types: 1 Glasses of wine per week     Comment: Drinks socially     1x monthly    Drug use: No    Sexual activity: Not on file   Lifestyle    Physical activity:     Days per week: Not on file     Minutes per session: Not on file    Stress: Not on file   Relationships    Social connections:     Talks on phone: Not on file     Gets together: Not on file     Attends Episcopal service: Not on file     Active member of club or organization: Not on file     Attends meetings of clubs or organizations: Not on file     Relationship status: Not on file    Intimate partner violence:     Fear of current or ex partner: Not on file     Emotionally abused: Not on file     Physically abused: Not on file     Forced sexual activity: Not on file   Other Topics Concern    Not on file   Social History Narrative    Not on file       Current Medications  Current Outpatient Medications   Medication Sig Dispense Refill    ascorbic acid (VITAMIN C) 250 mg tablet Take 500 mg by mouth daily      aspirin ([address]) 81 mg EC tablet Take 81 mg by mouth every evening        atorvastatin (LIPITOR) 20 mg tablet Take 20 mg by mouth daily      Cholecalciferol (VITAMIN D) 2000 units CAPS Take by mouth      Coenzyme Q10 (COQ10) 200 MG CAPS Take by mouth daily      cyanocobalamin 100 MCG tablet Take 100 mcg by mouth daily      gabapentin (NEURONTIN) 100 mg capsule Take 100 mg by mouth 2 (two) times a day      mupirocin (BACTROBAN) 2 % ointment APPLY BY TOPICAL ROUTE EVERY DAY A SMALL AMOUNT TO THE AFFECTED AREA  0    Omega-3 Fatty Acids (FISH OIL OMEGA-3 PO) Take by mouth daily 1 tsp       propranolol (INDERAL) 20 mg tablet Take 20 mg by mouth every evening        propranolol (INDERAL) 60 mg tablet Take 60 mg by mouth every morning        triamcinolone (KENALOG) 0 1 % cream APPLY THIN COAT TO AFFECTED AREA TWICE A DAY  0    finasteride (PROSCAR) 5 mg tablet Take 5 mg by mouth daily      tamsulosin (FLOMAX) 0 4 mg Take 0 4 mg by mouth daily with dinner       No current facility-administered medications for this visit  Allergies  Allergies   Allergen Reactions    Cefuroxime Hives    Cephalosporins     Other      Dust   Molds  Adhesive Tape,,,caused red dots     The following portions of the patient's history were reviewed and updated as appropriate: allergies, current medications, past medical history, past social history, past surgical history and problem list       Vitals  Vitals:    02/21/19 1257   Weight: 101 kg (223 lb)   Height: 6' 1" (1 854 m)       Physical Exam  Physical Exam   Constitutional: He is oriented to person, place, and time  He appears well-developed and well-nourished  No distress  HENT:   Head: Normocephalic and atraumatic  Eyes: EOM are normal    Neck: Normal range of motion  Cardiovascular: Normal rate, regular rhythm, normal heart sounds and intact distal pulses  Pulmonary/Chest: Effort normal and breath sounds normal  No respiratory distress  Abdominal: Soft   Bowel sounds are normal    Genitourinary:   Genitourinary Comments: SPT in place   Musculoskeletal: Normal range of motion  tremorous     Neurological: He is alert and oriented to person, place, and time  Skin: Skin is warm and dry  Psychiatric: He has a normal mood and affect  Vitals reviewed  Results  Recent Results (from the past 1 hour(s))   POCT urine dip auto non-scope    Collection Time: 02/21/19  1:26 PM   Result Value Ref Range     COLOR,UA yellow     CLARITY,UA clear     SPECIFIC GRAVITY,UA 1 015      PH,UA 6 0     LEUKOCYTE ESTERASE,UA large     NITRITE,UA positive     GLUCOSE, UA neg     KETONES,UA neg     BILIRUBIN,UA neg     BLOOD,UA mod     POCT URINE PROTEIN 100 mg/dl     SL AMB POCT UROBILINOGEN 0 2    POCT Measure PVR    Collection Time: 02/21/19  1:27 PM   Result Value Ref Range    POST-VOID RESIDUAL VOLUME, ML  mL   ]  No results found for: PSA  Lab Results   Component Value Date    CALCIUM 8 6 12/28/2018    K 4 3 12/28/2018    CO2 29 12/28/2018     12/28/2018    BUN 25 12/28/2018    CREATININE 1 44 (H) 12/28/2018     Lab Results   Component Value Date    WBC 8 90 12/28/2018    HGB 11 6 (L) 12/28/2018    HCT 36 6 12/28/2018    MCV 99 (H) 12/28/2018     01/08/2019     Orders  Orders Placed This Encounter   Procedures    US kidney and bladder     Standing Status:   Future     Standing Expiration Date:   2/21/2023     Scheduling Instructions:      "Prep required if being scheduled in conjunction with other studies, refer to those examination's Preps first before scheduling  All patients for US Kidney and Bladder they must drink 24 oz of water 60 minutes before your scheduled appointment time  This test requires you to have a FULL bladder  Please do not urinate before your test             Please bring your physician order, insurance cards, a form of photo ID and a list of your medications with you  Arrive 15 minutes prior to your appointment time in order to register  If you need to have lab work or a urinalysis, please do this AFTER your ultrasound  "     Order Specific Question:   Reason for Exam:     Answer:   Hydronephrosis    POCT urine dip auto non-scope    POCT Measure PVR     REGGIE Ware

## 2019-02-21 NOTE — PATIENT INSTRUCTIONS
Ultrasound of kidney and bladder to be done before next office visit  Document the amount of urine output daily and the amount of output from the SPT   Return in 2 weeks

## 2019-02-28 ENCOUNTER — HOSPITAL ENCOUNTER (OUTPATIENT)
Dept: ULTRASOUND IMAGING | Facility: MEDICAL CENTER | Age: 84
Discharge: HOME | End: 2019-02-28
Payer: MEDICARE

## 2019-02-28 DIAGNOSIS — N13.30 HYDRONEPHROSIS, UNSPECIFIED HYDRONEPHROSIS TYPE: ICD-10-CM

## 2019-02-28 PROCEDURE — 76770 US EXAM ABDO BACK WALL COMP: CPT

## 2019-03-08 ENCOUNTER — OFFICE VISIT (OUTPATIENT)
Dept: UROLOGY | Facility: MEDICAL CENTER | Age: 84
End: 2019-03-08
Payer: MEDICARE

## 2019-03-08 VITALS
SYSTOLIC BLOOD PRESSURE: 136 MMHG | HEART RATE: 52 BPM | WEIGHT: 220 LBS | BODY MASS INDEX: 29.16 KG/M2 | DIASTOLIC BLOOD PRESSURE: 80 MMHG | HEIGHT: 73 IN

## 2019-03-08 DIAGNOSIS — R33.8 ACUTE URINARY RETENTION: Primary | ICD-10-CM

## 2019-03-08 LAB — POST-VOID RESIDUAL VOLUME, ML POC: 247 ML

## 2019-03-08 PROCEDURE — 99024 POSTOP FOLLOW-UP VISIT: CPT | Performed by: NURSE PRACTITIONER

## 2019-03-08 PROCEDURE — 51798 US URINE CAPACITY MEASURE: CPT | Performed by: NURSE PRACTITIONER

## 2019-03-13 NOTE — PROGRESS NOTES
3/8/2019    Patient Active Problem List   Diagnosis    Acute urinary retention    Acute renal failure superimposed on stage 3 chronic kidney disease (HCC)    Essential tremor    Other hyperlipidemia    Other hydronephrosis    Renal insufficiency    Urinary retention with incomplete bladder emptying    BPH with urinary obstruction    Enlarged prostate with urinary obstruction    Penile erosion    Acute cystitis without hematuria       Assessment and Plan    80 y o  male managed by Dr Dorrene Saint  1  Chronic urinary retention  · Status post TURP and insertion of suprapubic Valentine catheter 01/08/2019  · Upon review of voiding diary kept over the past 2 weeks and the amount urine being empty daily from the SP tube and lack of urinary urgency and retention will discontinue SP tube today  · Ultrasound of kidney and bladder performed on 02/28/2019 reveals no hydronephrosis  · Discussed with Dr Dorrene Saint    History of Present Illness  Mable Loyola is a 80 y o  male here for follow up evaluation of  chronic urinary retention he is suprapubic catheter  Past history cm benign prostatic hyperplasia bill he is 7 weeks status post TURP  Over the past 2 weeks since prior movement, patient has been keeping a voiding diary measuring amount of urine output urethral E as well as opening his SP tube to gravity and recording the PVR  PVR bouts of been between 275 and 300 50 mL daily  Patient denies or dysuria, hematuria urinary frequency and urgency  Patient denies difficulties with his SP tube drainage  Peristomal skin of his SP tube is unremarkable  Denies fever chills  Review of Systems   Constitutional: Negative for chills and fever  Respiratory: Negative for cough and shortness of breath  Cardiovascular: Negative for chest pain  Gastrointestinal: Negative for abdominal distention, abdominal pain, blood in stool, nausea and vomiting     Genitourinary: Negative for difficulty urinating, dysuria, enuresis, flank pain, frequency, hematuria and urgency  SP tube in place   Musculoskeletal:        Tremor secondary to Parkinson's disease   Skin: Negative for rash  Neurological: Negative for dizziness and headaches  Past Medical History  Past Medical History:   Diagnosis Date    Back disorder     BPH with urinary obstruction     Bruise     right upper arm    Cancer (HCC)     cheekskin cancer    Cataract     Chronic pain disorder     Neck and knee    Elevated PSA     Essential tremor     History of total right hip replacement 2013    Hyperlipidemia     Hypertension     Indwelling Valentine catheter present     Irregular heartbeat     Knee pain, bilateral     raymond doing stairs    Muscle weakness     uses cane and wheelchair for distance    Nasal congestion     Prostate nodule     Risk for falls     Shortness of breath     "extreme exercise"    Swollen ankles     Teeth missing     Tremor     Wears glasses        Past Social History  Past Surgical History:   Procedure Laterality Date    COLONOSCOPY      CYSTOSCOPY      HIP SURGERY      JOINT REPLACEMENT Right     hip    LUMBAR EPIDURAL INJECTION      NC TRANSURETHRAL ELEC-SURG PROSTATECTOM N/A 1/8/2019    Procedure: CYSTOSCOPY, TRANSURETHRAL RESECTION OF PROSTATE (TURP);   Surgeon: Demetris Muir MD;  [facility];  Service: Urology    SKIN BIOPSY      SUPRAPUBIC TUBE PLACEMENT N/A 1/8/2019    Procedure: INSERTION SUPRAPUBIC CATHETER PERCUTANEOUS;  Surgeon: Demetris Muir MD;  [facility];  Service: Urology    TONSILECTOMY AND ADNOIDECTOMY      US GUIDED PROSTATE BIOPSY Bilateral 2003     Social History     Tobacco Use   Smoking Status Never Smoker   Smokeless Tobacco Never Used       Past Family History  Family History   Problem Relation Age of Onset    Heart disease Mother     Heart disease Father     Cancer Family     Heart disease Family        Past Social history  Social History     Socioeconomic History    Marital status: /Civil Union     Spouse name: Not on file    Number of children: Not on file    Years of education: Not on file    Highest education level: Not on file   Occupational History    Occupation:    food industry   Social Needs    Financial resource strain: Not on file    Food insecurity:     Worry: Not on file     Inability: Not on file    Transportation needs:     Medical: Not on file     Non-medical: Not on file   Tobacco Use    Smoking status: Never Smoker    Smokeless tobacco: Never Used   Substance and Sexual Activity    Alcohol use: Yes     Alcohol/week: 0 6 oz     Types: 1 Glasses of wine per week     Comment: Drinks socially     1x monthly    Drug use: No    Sexual activity: Not on file   Lifestyle    Physical activity:     Days per week: Not on file     Minutes per session: Not on file    Stress: Not on file   Relationships    Social connections:     Talks on phone: Not on file     Gets together: Not on file     Attends Restorationism service: Not on file     Active member of club or organization: Not on file     Attends meetings of clubs or organizations: Not on file     Relationship status: Not on file    Intimate partner violence:     Fear of current or ex partner: Not on file     Emotionally abused: Not on file     Physically abused: Not on file     Forced sexual activity: Not on file   Other Topics Concern    Not on file   Social History Narrative    Not on file       Current Medications  Current Outpatient Medications   Medication Sig Dispense Refill    ascorbic acid (VITAMIN C) 250 mg tablet Take 500 mg by mouth daily      aspirin (ECOTRIN LOW STRENGTH) 81 mg EC tablet Take 81 mg by mouth every evening        atorvastatin (LIPITOR) 20 mg tablet Take 20 mg by mouth daily      Cholecalciferol (VITAMIN D) 2000 units CAPS Take by mouth      Coenzyme Q10 (COQ10) 200 MG CAPS Take by mouth daily      cyanocobalamin 100 MCG tablet Take 100 mcg by mouth daily  gabapentin (NEURONTIN) 100 mg capsule Take 100 mg by mouth 2 (two) times a day      mupirocin (BACTROBAN) 2 % ointment APPLY BY TOPICAL ROUTE EVERY DAY A SMALL AMOUNT TO THE AFFECTED AREA  0    Omega-3 Fatty Acids (FISH OIL OMEGA-3 PO) Take by mouth daily 1 tsp       propranolol (INDERAL) 20 mg tablet Take 20 mg by mouth every evening        propranolol (INDERAL) 60 mg tablet Take 60 mg by mouth every morning        triamcinolone (KENALOG) 0 1 % cream APPLY THIN COAT TO AFFECTED AREA TWICE A DAY  0    finasteride (PROSCAR) 5 mg tablet Take 5 mg by mouth daily      tamsulosin (FLOMAX) 0 4 mg Take 0 4 mg by mouth daily with dinner       No current facility-administered medications for this visit  Allergies  Allergies   Allergen Reactions    Cefuroxime Hives    Cephalosporins     Other      Dust   Molds  Adhesive Tape,,,caused red dots     The following portions of the patient's history were reviewed and updated as appropriate: allergies, current medications, past medical history, past social history, past surgical history and problem list       Vitals  Vitals:    03/08/19 1433   BP: 136/80   BP Location: Left arm   Patient Position: Sitting   Cuff Size: Adult   Pulse: (!) 52   Weight: 99 8 kg (220 lb)   Height: 6' 1" (1 854 m)           Physical Exam  Physical Exam   Constitutional: He appears well-developed and well-nourished  HENT:   Head: Atraumatic  Eyes: EOM are normal    Neck: Normal range of motion  Neck supple  Cardiovascular: Normal rate, regular rhythm, normal heart sounds and intact distal pulses  Exam reveals no friction rub  No murmur heard  Pulmonary/Chest: Effort normal and breath sounds normal  No respiratory distress  Abdominal: Soft  Bowel sounds are normal    Genitourinary:   Genitourinary Comments: SP tube in place   Musculoskeletal: Normal range of motion  Tremors   Neurological: He is alert  Skin: Skin is warm and dry     Psychiatric: He has a normal mood and affect  Vitals reviewed  Results  No results found for this or any previous visit (from the past 1 hour(s))  ]  No results found for: PSA  Lab Results   Component Value Date    CALCIUM 8 6 12/28/2018    K 4 3 12/28/2018    CO2 29 12/28/2018     12/28/2018    BUN 25 12/28/2018    CREATININE 1 44 (H) 12/28/2018     Lab Results   Component Value Date    WBC 8 90 12/28/2018    HGB 11 6 (L) 12/28/2018    HCT 36 6 12/28/2018    MCV 99 (H) 12/28/2018     01/08/2019     Orders  Orders Placed This Encounter   Procedures    POCT Measure PVR     REGGIE Alves

## 2019-06-14 ENCOUNTER — OFFICE VISIT (OUTPATIENT)
Dept: UROLOGY | Facility: MEDICAL CENTER | Age: 84
End: 2019-06-14
Payer: MEDICARE

## 2019-06-14 VITALS
SYSTOLIC BLOOD PRESSURE: 128 MMHG | WEIGHT: 230 LBS | DIASTOLIC BLOOD PRESSURE: 68 MMHG | BODY MASS INDEX: 30.48 KG/M2 | HEART RATE: 64 BPM | HEIGHT: 73 IN

## 2019-06-14 DIAGNOSIS — N13.8 BPH WITH URINARY OBSTRUCTION: Primary | ICD-10-CM

## 2019-06-14 DIAGNOSIS — N40.1 BPH WITH URINARY OBSTRUCTION: Primary | ICD-10-CM

## 2019-06-14 LAB — POST-VOID RESIDUAL VOLUME, ML POC: 365 ML

## 2019-06-14 PROCEDURE — 99213 OFFICE O/P EST LOW 20 MIN: CPT | Performed by: UROLOGY

## 2019-06-14 PROCEDURE — 1124F ACP DISCUSS-NO DSCNMKR DOCD: CPT | Performed by: UROLOGY

## 2019-06-14 PROCEDURE — 51798 US URINE CAPACITY MEASURE: CPT | Performed by: UROLOGY

## 2019-08-15 ENCOUNTER — TELEPHONE (OUTPATIENT)
Dept: UROLOGY | Facility: CLINIC | Age: 84
End: 2019-08-15

## 2019-08-15 ENCOUNTER — HOSPITAL ENCOUNTER (EMERGENCY)
Facility: HOSPITAL | Age: 84
Discharge: HOME | End: 2019-08-15
Attending: EMERGENCY MEDICINE | Admitting: EMERGENCY MEDICINE
Payer: MEDICARE

## 2019-08-15 VITALS
BODY MASS INDEX: 29.64 KG/M2 | OXYGEN SATURATION: 95 % | TEMPERATURE: 97.9 F | HEART RATE: 69 BPM | SYSTOLIC BLOOD PRESSURE: 129 MMHG | WEIGHT: 224.65 LBS | RESPIRATION RATE: 18 BRPM | DIASTOLIC BLOOD PRESSURE: 84 MMHG

## 2019-08-15 DIAGNOSIS — R31.9 HEMATURIA: ICD-10-CM

## 2019-08-15 DIAGNOSIS — N39.0 URINARY TRACT INFECTION: Primary | ICD-10-CM

## 2019-08-15 LAB
ALBUMIN SERPL BCP-MCNC: 3.1 G/DL (ref 3.5–5)
ALP SERPL-CCNC: 101 U/L (ref 46–116)
ALT SERPL W P-5'-P-CCNC: 35 U/L (ref 12–78)
ANION GAP SERPL CALCULATED.3IONS-SCNC: 8 MMOL/L (ref 4–13)
APTT PPP: 30 SECONDS (ref 23–37)
AST SERPL W P-5'-P-CCNC: 25 U/L (ref 5–45)
BACTERIA UR QL AUTO: ABNORMAL /HPF
BASOPHILS # BLD AUTO: 0.06 THOUSANDS/ΜL (ref 0–0.1)
BASOPHILS NFR BLD AUTO: 0 % (ref 0–1)
BILIRUB SERPL-MCNC: 0.3 MG/DL (ref 0.2–1)
BILIRUB UR QL STRIP: NEGATIVE
BUN SERPL-MCNC: 32 MG/DL (ref 5–25)
CALCIUM SERPL-MCNC: 8.8 MG/DL (ref 8.3–10.1)
CHLORIDE SERPL-SCNC: 104 MMOL/L (ref 100–108)
CLARITY UR: ABNORMAL
CO2 SERPL-SCNC: 27 MMOL/L (ref 21–32)
COLOR UR: ABNORMAL
CREAT SERPL-MCNC: 1.41 MG/DL (ref 0.6–1.3)
EOSINOPHIL # BLD AUTO: 0.36 THOUSAND/ΜL (ref 0–0.61)
EOSINOPHIL NFR BLD AUTO: 3 % (ref 0–6)
ERYTHROCYTE [DISTWIDTH] IN BLOOD BY AUTOMATED COUNT: 14.6 % (ref 11.6–15.1)
GFR SERPL CREATININE-BSD FRML MDRD: 45 ML/MIN/1.73SQ M
GLUCOSE SERPL-MCNC: 108 MG/DL (ref 65–140)
GLUCOSE UR STRIP-MCNC: NEGATIVE MG/DL
HCT VFR BLD AUTO: 37.9 % (ref 36.5–49.3)
HGB BLD-MCNC: 12 G/DL (ref 12–17)
HGB UR QL STRIP.AUTO: ABNORMAL
IMM GRANULOCYTES # BLD AUTO: 0.04 THOUSAND/UL (ref 0–0.2)
IMM GRANULOCYTES NFR BLD AUTO: 0 % (ref 0–2)
INR PPP: 0.98 (ref 0.84–1.19)
KETONES UR STRIP-MCNC: NEGATIVE MG/DL
LEUKOCYTE ESTERASE UR QL STRIP: ABNORMAL
LYMPHOCYTES # BLD AUTO: 2.39 THOUSANDS/ΜL (ref 0.6–4.47)
LYMPHOCYTES NFR BLD AUTO: 17 % (ref 14–44)
MCH RBC QN AUTO: 31.7 PG (ref 26.8–34.3)
MCHC RBC AUTO-ENTMCNC: 31.7 G/DL (ref 31.4–37.4)
MCV RBC AUTO: 100 FL (ref 82–98)
MONOCYTES # BLD AUTO: 1.04 THOUSAND/ΜL (ref 0.17–1.22)
MONOCYTES NFR BLD AUTO: 7 % (ref 4–12)
NEUTROPHILS # BLD AUTO: 10.48 THOUSANDS/ΜL (ref 1.85–7.62)
NEUTS SEG NFR BLD AUTO: 73 % (ref 43–75)
NITRITE UR QL STRIP: NEGATIVE
NON-SQ EPI CELLS URNS QL MICRO: ABNORMAL /HPF
NRBC BLD AUTO-RTO: 0 /100 WBCS
PH UR STRIP.AUTO: 6 [PH]
PLATELET # BLD AUTO: 379 THOUSANDS/UL (ref 149–390)
PMV BLD AUTO: 9.1 FL (ref 8.9–12.7)
POTASSIUM SERPL-SCNC: 4.5 MMOL/L (ref 3.5–5.3)
PROT SERPL-MCNC: 7.2 G/DL (ref 6.4–8.2)
PROT UR STRIP-MCNC: >=300 MG/DL
PROTHROMBIN TIME: 13.1 SECONDS (ref 11.6–14.5)
RBC # BLD AUTO: 3.79 MILLION/UL (ref 3.88–5.62)
RBC #/AREA URNS AUTO: ABNORMAL /HPF
SODIUM SERPL-SCNC: 139 MMOL/L (ref 136–145)
SP GR UR STRIP.AUTO: 1.02 (ref 1–1.03)
UROBILINOGEN UR QL STRIP.AUTO: 0.2 E.U./DL
WBC # BLD AUTO: 14.37 THOUSAND/UL (ref 4.31–10.16)
WBC #/AREA URNS AUTO: ABNORMAL /HPF

## 2019-08-15 PROCEDURE — 87086 URINE CULTURE/COLONY COUNT: CPT | Performed by: EMERGENCY MEDICINE

## 2019-08-15 PROCEDURE — 80053 COMPREHEN METABOLIC PANEL: CPT | Performed by: EMERGENCY MEDICINE

## 2019-08-15 PROCEDURE — 81001 URINALYSIS AUTO W/SCOPE: CPT | Performed by: EMERGENCY MEDICINE

## 2019-08-15 PROCEDURE — 96366 THER/PROPH/DIAG IV INF ADDON: CPT

## 2019-08-15 PROCEDURE — 36415 COLL VENOUS BLD VENIPUNCTURE: CPT | Performed by: EMERGENCY MEDICINE

## 2019-08-15 PROCEDURE — 99284 EMERGENCY DEPT VISIT MOD MDM: CPT | Performed by: EMERGENCY MEDICINE

## 2019-08-15 PROCEDURE — 85610 PROTHROMBIN TIME: CPT | Performed by: EMERGENCY MEDICINE

## 2019-08-15 PROCEDURE — 96365 THER/PROPH/DIAG IV INF INIT: CPT

## 2019-08-15 PROCEDURE — 85730 THROMBOPLASTIN TIME PARTIAL: CPT | Performed by: EMERGENCY MEDICINE

## 2019-08-15 PROCEDURE — 99283 EMERGENCY DEPT VISIT LOW MDM: CPT

## 2019-08-15 PROCEDURE — 85025 COMPLETE CBC W/AUTO DIFF WBC: CPT | Performed by: EMERGENCY MEDICINE

## 2019-08-15 RX ORDER — CIPROFLOXACIN 2 MG/ML
400 INJECTION, SOLUTION INTRAVENOUS ONCE
Status: COMPLETED | OUTPATIENT
Start: 2019-08-15 | End: 2019-08-15

## 2019-08-15 RX ORDER — CIPROFLOXACIN 500 MG/1
500 TABLET, FILM COATED ORAL 2 TIMES DAILY
Qty: 14 TABLET | Refills: 0 | Status: SHIPPED | OUTPATIENT
Start: 2019-08-15 | End: 2019-08-22

## 2019-08-15 RX ADMIN — CIPROFLOXACIN 400 MG: 2 INJECTION, SOLUTION INTRAVENOUS at 22:13

## 2019-08-15 NOTE — TELEPHONE ENCOUNTER
Patient with BPH s/p TURP 4/2019  Called secondary to bleeding of penis  Patient and wife unable to tell me where bleeding is coming from  Patient had irritation to the tip of his penis and is wearing sanitary pads and depends  Recommended they apply pressure and push fluids  Provided ER precautions  Please call to check on patient

## 2019-08-16 NOTE — ED PROVIDER NOTES
History  Chief Complaint   Patient presents with    Urinary Urgency     patient reports urinary urgency, frequency, and blood noted in depends that started 2 days ago  79 yo male with 2 days of gross hematuria, told wife about it today and she told daughter who brought him in this evening for eval   No urinary symptoms, no flank pain or fever/chills  History provided by:  Patient, spouse and relative (daughter)   used: No    Blood in Urine   This is a new problem  The current episode started yesterday  The problem is unchanged  He describes the hematuria as gross hematuria  He reports no clotting in his urine stream  His pain is at a severity of 0/10  He is experiencing no pain  He describes his urine color as dark red  Irritative symptoms do not include frequency, nocturia or urgency  Obstructive symptoms do not include incomplete emptying  Pertinent negatives include no abdominal pain, chills, dysuria, fever, flank pain, inability to urinate, nausea or vomiting  Prior to Admission Medications   Prescriptions Last Dose Informant Patient Reported? Taking?    Cholecalciferol (VITAMIN D) 2000 units CAPS  Self Yes No   Sig: Take by mouth   Coenzyme Q10 (COQ10) 200 MG CAPS  Self Yes No   Sig: Take by mouth daily   Omega-3 Fatty Acids (FISH OIL OMEGA-3 PO)  Self Yes No   Sig: Take by mouth daily 1 tsp    ascorbic acid (VITAMIN C) 250 mg tablet  Self Yes No   Sig: Take 500 mg by mouth daily   aspirin (ECOTRIN LOW STRENGTH) 81 mg EC tablet  Self Yes No   Sig: Take 81 mg by mouth every evening     atorvastatin (LIPITOR) 20 mg tablet  Self Yes No   Sig: Take 20 mg by mouth daily   cyanocobalamin 100 MCG tablet  Self Yes No   Sig: Take 100 mcg by mouth daily   finasteride (PROSCAR) 5 mg tablet  Self Yes No   Sig: Take 5 mg by mouth daily   gabapentin (NEURONTIN) 100 mg capsule  Self Yes No   Sig: Take 100 mg by mouth 2 (two) times a day   mupirocin (BACTROBAN) 2 % ointment  Self Yes No Sig: APPLY BY TOPICAL ROUTE EVERY DAY A SMALL AMOUNT TO THE AFFECTED AREA   propranolol (INDERAL) 20 mg tablet  Self Yes No   Sig: Take 20 mg by mouth every evening     propranolol (INDERAL) 60 mg tablet  Self Yes No   Sig: Take 60 mg by mouth every morning     tamsulosin (FLOMAX) 0 4 mg  Self Yes No   Sig: Take 0 4 mg by mouth daily with dinner   triamcinolone (KENALOG) 0 1 % cream  Self Yes No   Sig: APPLY THIN COAT TO AFFECTED AREA TWICE A DAY      Facility-Administered Medications: None       Past Medical History:   Diagnosis Date    Back disorder     BPH with urinary obstruction     Bruise     right upper arm    Cancer (HCC)     cheekskin cancer    Cataract     Chronic pain disorder     Neck and knee    Elevated PSA     Essential tremor     History of total right hip replacement 2013    Hyperlipidemia     Hypertension     Indwelling Valentine catheter present     Irregular heartbeat     Knee pain, bilateral     raymond doing stairs    Muscle weakness     uses cane and wheelchair for distance    Nasal congestion     Prostate nodule     Risk for falls     Shortness of breath     "extreme exercise"    Swollen ankles     Teeth missing     Tremor     Wears glasses        Past Surgical History:   Procedure Laterality Date    CATARACT EXTRACTION Bilateral 03/2019, 04/2019    COLONOSCOPY      CYSTOSCOPY      HIP SURGERY      JOINT REPLACEMENT Right     hip    LUMBAR EPIDURAL INJECTION      RI TRANSURETHRAL ELEC-SURG PROSTATECTOM N/A 1/8/2019    Procedure: CYSTOSCOPY, TRANSURETHRAL RESECTION OF PROSTATE (TURP);   Surgeon: Cata Haji MD;  Location: [facility];  Service: Urology    SKIN BIOPSY      SUPRAPUBIC TUBE PLACEMENT N/A 1/8/2019    Procedure: INSERTION SUPRAPUBIC CATHETER PERCUTANEOUS;  Surgeon: Cata Haji MD;  Location[facility];  Service: Urology    TONSILECTOMY AND ADNOIDECTOMY     [id number] [place] BIOPSY Bilateral 2003       Family History   Problem Relation Age of Onset  Heart disease Mother     Heart disease Father     Cancer Family     Heart disease Family      I have reviewed and agree with the history as documented  Social History     Tobacco Use    Smoking status: Never Smoker    Smokeless tobacco: Never Used   Substance Use Topics    Alcohol use: Yes     Alcohol/week: 1 0 standard drinks     Types: 1 Glasses of wine per week     Comment: Drinks socially  1x monthly    Drug use: No        Review of Systems   Constitutional: Negative for chills and fever  HENT: Negative for congestion and sore throat  Eyes: Negative for visual disturbance  Respiratory: Negative for shortness of breath and wheezing  Cardiovascular: Negative for chest pain and palpitations  Gastrointestinal: Negative for abdominal pain, diarrhea, nausea and vomiting  Genitourinary: Positive for hematuria  Negative for dysuria, flank pain, frequency, incomplete emptying, nocturia, penile pain, penile swelling and urgency  Musculoskeletal: Negative for neck pain and neck stiffness  Skin: Negative for pallor and rash  Neurological: Negative for headaches  Psychiatric/Behavioral: Negative for confusion  All other systems reviewed and are negative  Physical Exam  Physical Exam   Constitutional: He is oriented to person, place, and time  He appears well-developed and well-nourished  No distress  HENT:   Head: Normocephalic and atraumatic  Mouth/Throat: Oropharynx is clear and moist    Eyes: EOM are normal    Neck: Neck supple  Cardiovascular: Normal rate and regular rhythm  No murmur heard  Pulmonary/Chest: Effort normal and breath sounds normal    Abdominal: Soft  Bowel sounds are normal  He exhibits no distension  There is no tenderness  Genitourinary: Penis normal    Genitourinary Comments: Some blood noted on depends, no lesions or abrasions externally   Musculoskeletal: Normal range of motion  He exhibits no edema     Neurological: He is alert and oriented to person, place, and time  Skin: Skin is warm  No rash noted  No pallor  Psychiatric: He has a normal mood and affect  His behavior is normal    Nursing note and vitals reviewed        Vital Signs  ED Triage Vitals   Temperature Pulse Respirations Blood Pressure SpO2   08/15/19 2051 08/15/19 2051 08/15/19 2051 08/15/19 2052 08/15/19 2051   97 9 °F (36 6 °C) 65 21 (!) 176/77 95 %      Temp Source Heart Rate Source Patient Position - Orthostatic VS BP Location FiO2 (%)   08/15/19 2051 08/15/19 2306 08/15/19 2052 08/15/19 2052 --   Oral Monitor Sitting Right arm       Pain Score       08/15/19 2306       No Pain           Vitals:    08/15/19 2051 08/15/19 2052 08/15/19 2306   BP:  (!) 176/77 129/84   Pulse: 65  69   Patient Position - Orthostatic VS:  Sitting Sitting         Visual Acuity      ED Medications  Medications   ciprofloxacin (CIPRO) IVPB (premix) 400 mg (400 mg Intravenous New Bag 8/15/19 2213)       Diagnostic Studies  Results Reviewed     Procedure Component Value Units Date/Time    Comprehensive metabolic panel [[id number]]  (Abnormal) Collected:  08/15/19 2142    Lab Status:  Final result Specimen:  Blood from Arm, Right Updated:  08/15/19 2216     Sodium 139 mmol/L      Potassium 4 5 mmol/L      Chloride 104 mmol/L      CO2 27 mmol/L      ANION GAP 8 mmol/L      BUN 32 mg/dL      Creatinine 1 41 mg/dL      Glucose 108 mg/dL      Calcium 8 8 mg/dL      AST 25 U/L      ALT 35 U/L      Alkaline Phosphatase 101 U/L      Total Protein 7 2 g/dL      Albumin 3 1 g/dL      Total Bilirubin 0 30 mg/dL      eGFR 45 ml/min/1 73sq m     Narrative:       Armando guidelines for Chronic Kidney Disease (CKD):     Stage 1 with normal or high GFR (GFR > 90 mL/min/1 73 square meters)    Stage 2 Mild CKD (GFR = 60-89 mL/min/1 73 square meters)    Stage 3A Moderate CKD (GFR = 45-59 mL/min/1 73 square meters)    Stage 3B Moderate CKD (GFR = 30-44 mL/min/1 73 square meters)    Stage 4 Severe CKD (GFR = 15-29 mL/min/1 73 square meters)    Stage 5 End Stage CKD (GFR <15 mL/min/1 73 square meters)  Note: GFR calculation is accurate only with a steady state creatinine    Protime-INR [[id number]]  (Normal) Collected:  08/15/19 2142    Lab Status:  Final result Specimen:  Blood from Arm, Right Updated:  08/15/19 2210     Protime 13 1 seconds      INR 0 98    APTT [[id number]]  (Normal) Collected:  08/15/19 2142    Lab Status:  Final result Specimen:  Blood from Arm, Right Updated:  08/15/19 2210     PTT 30 seconds     CBC and differential [[id number]]  (Abnormal) Collected:  08/15/19 2142    Lab Status:  Final result Specimen:  Blood from Arm, Right Updated:  08/15/19 2200     WBC 14 37 Thousand/uL      RBC 3 79 Million/uL      Hemoglobin 12 0 g/dL      Hematocrit 37 9 %       fL      MCH 31 7 pg      MCHC 31 7 g/dL      RDW 14 6 %      MPV 9 1 fL      Platelets 952 Thousands/uL      nRBC 0 /100 WBCs      Neutrophils Relative 73 %      Immat GRANS % 0 %      Lymphocytes Relative 17 %      Monocytes Relative 7 %      Eosinophils Relative 3 %      Basophils Relative 0 %      Neutrophils Absolute 10 48 Thousands/µL      Immature Grans Absolute 0 04 Thousand/uL      Lymphocytes Absolute 2 39 Thousands/µL      Monocytes Absolute 1 04 Thousand/µL      Eosinophils Absolute 0 36 Thousand/µL      Basophils Absolute 0 06 Thousands/µL     Urine Microscopic [[id number]]  (Abnormal) Collected:  08/15/19 2122    Lab Status:  Final result Specimen:  Urine, Clean Catch Updated:  08/15/19 2156     RBC, UA       Field obscured, unable to enumerate     /hpf     WBC, UA       Field obscured, unable to enumerate     /hpf     Epithelial Cells       Field obscured, unable to enumerate     /hpf     Bacteria, UA       Field obscured, unable to enumerate     /hpf    Urine culture [[id number]] Collected:  08/15/19 2122    Lab Status:   In process Specimen:  Urine, Clean Catch Updated:  08/15/19 2156    UA w Reflex to Microscopic w Reflex to Culture [[id number]]  (Abnormal) Collected:  08/15/19 2122    Lab Status:  Final result Specimen:  Urine, Clean Catch Updated:  08/15/19 2156     Color, UA Zoë Eglin     Clarity, UA Cloudy     Specific [place], UA 1 020     pH, UA 6 0     Leukocytes, UA Moderate     Nitrite, UA Negative     Protein, UA >=300 mg/dl      Glucose, UA Negative mg/dl      Ketones, UA Negative mg/dl      Urobilinogen, UA 0 2 E U /dl      Bilirubin, UA Negative     Blood, UA Large                 No orders to display              Procedures  Procedures       ED Course  ED Course as of Aug 15 2317   u Aug 15, 2019   2114 Pt seen and examined  81 yo male with 2 days of gross hematuria, told wife about it today and she told daughter who brought him in this evening for eval   No urinary symptoms, no flank pain or fever/chills  Will send labs, u/a and urine cx        2202 WBC 14 37, large blood and moderate leuks - micro obscured,  Will treat with IV cipro  2218 Creat 1 41, baseline for pt        2309 Pt very comfortable, will d/c home as IV cipro has finished  MDM    Disposition  Final diagnoses:   Urinary tract infection   Hematuria     Time reflects when diagnosis was documented in both MDM as applicable and the Disposition within this note     Time User Action Codes Description Comment    8/15/2019 10:19 PM Peter LARSON Add [N39 0] Urinary tract infection     8/15/2019 10:19 PM Peter LARSON Add [R31 9] Hematuria       ED Disposition     ED Disposition Condition Date/Time Comment    Discharge Stable u Aug 15, 2019 10:19 PM April Palmer discharge to home/self care              Follow-up Information     Follow up With Specialties Details Why Scotty Blood MD General Surgery Schedule an appointment as soon as possible for a visit   [address] [facility]  [phone]            Patient's Medications   Discharge Prescriptions    CIPROFLOXACIN (CIPRO) 500 MG TABLET    Take 1 tablet (500 mg total) by mouth 2 (two) times a day for 7 days       Start Date: 8/15/2019 End Date: 8/22/2019       Order Dose: 500 mg       Quantity: 14 tablet    Refills: 0     No discharge procedures on file      ED Provider  Electronically Signed by           Denilson Coates DO  08/15/19 4038

## 2019-08-16 NOTE — TELEPHONE ENCOUNTER
Spoke to wife, bleeding has stopped and urine is clear  Pt was placed on one week course of Cipro BID  Pt doing much better  Will call office if symptoms return or develops fever  Informed there is on call Provider available over the weekend  Will notify Dr Deneen Perez

## 2019-08-16 NOTE — ED NOTES
Pt provided urinal at this time; instructed to ring call bell when finished      Fina Negrete  65/70/61 1107

## 2019-08-17 LAB — BACTERIA UR CULT: NORMAL

## 2019-10-08 ENCOUNTER — TELEPHONE (OUTPATIENT)
Dept: UROLOGY | Facility: MEDICAL CENTER | Age: 84
End: 2019-10-08

## 2019-10-08 NOTE — TELEPHONE ENCOUNTER
[facility] Discharge please triage  Patient of Dr Cha Bailon    Patient was in rehab with [facility] and discharged last week with catheter

## 2019-10-08 NOTE — TELEPHONE ENCOUNTER
Called patient - spoke with wife -  Patient was admitted to [facility] on 09/15/19 for possible UTI  Patient was treated back in August for a UTI  Patient started to feel strange at the beginning of the month and couldn't walk up the stairs to the bedroom  A couple of days later he fell and they had to call 911  He had been on antibiotics when they did the urine culture in the hospital so there was no growth  At that time, the determined that the patient was not emptying his bladder and a sifuentes was placed  Patient is currently at home and has [facility] coming out  Please review patient's chart and determine when you would like to have a trial of voiding

## 2019-10-09 NOTE — TELEPHONE ENCOUNTER
Spoke to pt's spouse and per Dr Andrew Buchanan pt can have sifuentes removed on 10/14 by the [facility] and return to our office on [phone] for a PVR by the nurse  Pt's spouse will have the [facility] call for orders to have the catheter removed   Pt's spouse also knows to call the office if pt' has difficulty voiding after removal

## 2019-10-11 NOTE — TELEPHONE ENCOUNTER
Called  [facility] - they will go out and do a trial of voiding on Monday  They will check him in 5-6 hours after sifuentes is removed  Reinsert if over 300 cc

## 2019-10-11 NOTE — TELEPHONE ENCOUNTER
Anirudh Sanders from [facility] reaching out to get orders for Monday's sifuentes removal and to share some concerns patient has  Please call

## 2019-10-11 NOTE — TELEPHONE ENCOUNTER
GISELA NORTON called in regard to patient voicing fear  He has had catheter removed in past ans experienced inability to void  [facility] asking for guidance of how long to wait until patient voids  Question regarding very specific orders    Please call Ana Lilia Amador [phone] with GISELA NORTON  Thank you

## 2019-10-14 NOTE — TELEPHONE ENCOUNTER
Call from [address]  When she removed pt's sifuentes, she found that he'd had a coude placed  She doesn't have coude catheters on hand, only straight tip and silicone catheters, asking if he has to have coude should he need Sifuentes replaced  In looking back through pt's chart, it doesn't appear we have every place a Sifuentes in our office, he only has history of SPT  ER notes from [facility] don't specify that coude was used, or why, but it appears that his retention was caused by UTI, not prostate  I advised a straight tip sifuentes should be fine, if he needs to have one replaced  If she is unable to insert straight tip Sifuentes, he may need to go to ER

## 2019-10-15 NOTE — TELEPHONE ENCOUNTER
Juan Montoya RN from [facility] would like a return call  She has some questions for clinical   She can be reached at [phone]

## 2019-10-15 NOTE — TELEPHONE ENCOUNTER
[facility], pt failed voiding trial yesterday  He was unable to void and had over 600ml in urinary retention  Sharla Eduardo placed a 16F straight tip sifuentes without difficulty but during the night, he was leaking around the catheter  She replaced it with a 91X All silicone catheter and it is working fine  Will forward to Dr Denny Hodgson as to what is his next step

## 2019-10-22 ENCOUNTER — PROCEDURE VISIT (OUTPATIENT)
Dept: UROLOGY | Facility: MEDICAL CENTER | Age: 84
End: 2019-10-22
Payer: MEDICARE

## 2019-10-22 VITALS — HEIGHT: 73 IN | BODY MASS INDEX: 29.8 KG/M2 | WEIGHT: 224.87 LBS | HEART RATE: 81 BPM | TEMPERATURE: 97.8 F

## 2019-10-22 DIAGNOSIS — N13.8 BPH WITH URINARY OBSTRUCTION: Primary | ICD-10-CM

## 2019-10-22 DIAGNOSIS — N40.1 BPH WITH URINARY OBSTRUCTION: Primary | ICD-10-CM

## 2019-10-22 DIAGNOSIS — N40.0 BENIGN PROSTATIC HYPERPLASIA, UNSPECIFIED WHETHER LOWER URINARY TRACT SYMPTOMS PRESENT: Primary | ICD-10-CM

## 2019-10-22 LAB — POST-VOID RESIDUAL VOLUME, ML POC: 331 ML

## 2019-10-22 PROCEDURE — 51798 US URINE CAPACITY MEASURE: CPT

## 2019-10-22 PROCEDURE — 99211 OFF/OP EST MAY X REQ PHY/QHP: CPT

## 2019-10-22 RX ORDER — FINASTERIDE 5 MG/1
5 TABLET, FILM COATED ORAL DAILY
Qty: 90 TABLET | Refills: 3 | Status: SHIPPED | OUTPATIENT
Start: 2019-10-22 | End: 2020-01-30 | Stop reason: SDUPTHER

## 2019-10-22 NOTE — PROGRESS NOTES
10/22/2019    Cherelle Frank  1935  [id number]    Diagnosis  Chief Complaint     Urinary Retention          Patient presents for catheter removal managed by Dr Jayden Nunez this afternoon to assess retention    Procedure Valentine removal/voiding trial    Valentine catheter removed after deflation of an intact balloon  Patient tolerated well  Encouraged patient to hydrate well and return this afternoon for post void residual   He knows he is to call early if uncomfortable and unable to urinate  Patient understands all instructions        Vitals:    10/22/19 0927   Pulse: 81   Temp: 97 8 °F (36 6 °C)   TempSrc: Tympanic   Weight: 102 kg (224 lb 13 9 oz)   Height: 6' 1" (1 854 m)           [address] [facility]

## 2019-10-22 NOTE — PROGRESS NOTES
Pt returned for PVR  He states his urination started out slow this morning, but he is urinating much better now  His PVR = 331ml  I explained to him that it is borderline as to needing a sifuentes catheter and he stated he did not want a catheter at this time  He is not having any bladder pressure or pain  Instructed pt and his wife he will need to go to the [facility] [place] or return to the office tomorrow if he is having difficulty voiding or develops pain or pressure in his bladder  Both of them verbalized understanding

## 2019-10-23 ENCOUNTER — TELEPHONE (OUTPATIENT)
Dept: UROLOGY | Facility: MEDICAL CENTER | Age: 84
End: 2019-10-23

## 2019-10-23 ENCOUNTER — PROCEDURE VISIT (OUTPATIENT)
Dept: UROLOGY | Facility: MEDICAL CENTER | Age: 84
End: 2019-10-23
Payer: MEDICARE

## 2019-10-23 DIAGNOSIS — R33.9 RETENTION OF URINE: Primary | ICD-10-CM

## 2019-10-23 LAB — POST-VOID RESIDUAL VOLUME, ML POC: 132 ML

## 2019-10-23 PROCEDURE — 51798 US URINE CAPACITY MEASURE: CPT

## 2019-10-23 NOTE — TELEPHONE ENCOUNTER
Pt managed by Dr Baldwin was in office yesterday unable to do bladder scan was told he could come in today?

## 2019-10-23 NOTE — TELEPHONE ENCOUNTER
Spoke to pt  Had sifuentes removed 10/22 and is requesting to come in today for bladder scan  States he has been hydrating and has strong stream  Was informed he could return today for bladder scan  Appt given for this afternoon

## 2019-10-23 NOTE — PROGRESS NOTES
Pt in office to assess retention after catheter removal on 10-22-19  Bladder scan this afternoon 132cc  Pt told to continue to drink qs water  He understands all instructions

## 2019-11-11 DIAGNOSIS — N40.1 BENIGN PROSTATIC HYPERPLASIA WITH URINARY FREQUENCY: Primary | ICD-10-CM

## 2019-11-11 DIAGNOSIS — R35.0 BENIGN PROSTATIC HYPERPLASIA WITH URINARY FREQUENCY: Primary | ICD-10-CM

## 2019-11-11 RX ORDER — TAMSULOSIN HYDROCHLORIDE 0.4 MG/1
0.4 CAPSULE ORAL
Qty: 90 CAPSULE | Refills: 0 | Status: SHIPPED | OUTPATIENT
Start: 2019-11-11 | End: 2020-01-30 | Stop reason: SDUPTHER

## 2019-11-11 NOTE — TELEPHONE ENCOUNTER
Patient left a message on the Medication Refill voice mail line requesting a new prescription for Tamsulosin, 90 day supply to [facility]  The patient has an upcoming office visit scheduled for 11/22/2019 with Dr Saint Nordmann in the [facility] location but will run out of medication until then    Request for same, 90 day supply with NO refills was queued and forwarded to the Advanced Practitioner covering the [facility] location for approval

## 2019-11-22 ENCOUNTER — OFFICE VISIT (OUTPATIENT)
Dept: UROLOGY | Facility: MEDICAL CENTER | Age: 84
End: 2019-11-22
Payer: MEDICARE

## 2019-11-22 DIAGNOSIS — N30.00 ACUTE CYSTITIS WITHOUT HEMATURIA: Primary | ICD-10-CM

## 2019-11-22 DIAGNOSIS — N40.1 BPH WITH URINARY OBSTRUCTION: ICD-10-CM

## 2019-11-22 DIAGNOSIS — N13.8 BPH WITH URINARY OBSTRUCTION: ICD-10-CM

## 2019-11-22 LAB — POST-VOID RESIDUAL VOLUME, ML POC: 135 ML

## 2019-11-22 PROCEDURE — 99213 OFFICE O/P EST LOW 20 MIN: CPT | Performed by: UROLOGY

## 2019-11-22 PROCEDURE — 51798 US URINE CAPACITY MEASURE: CPT | Performed by: UROLOGY

## 2019-11-22 RX ORDER — PROPANTHELINE BROMIDE 15 MG/1
15 TABLET, FILM COATED ORAL 4 TIMES DAILY
COMMUNITY
End: 2019-11-22

## 2019-11-22 RX ORDER — ACETAMINOPHEN 325 MG/1
650 TABLET ORAL EVERY 6 HOURS PRN
COMMUNITY

## 2019-11-22 NOTE — PATIENT INSTRUCTIONS
If you have symptoms of infection, go to a [facility] lab and turn in a urine culture  Call our office and let us know, and we will be expecting the results  It takes 48 hours for the culture results to come back  If your symptoms are not too bad, we will wait for the results before we decide on an antibiotic  HOWEVER, if the symptoms are really bad, we will consider starting an antibiotic after you turn in the urine to the lab

## 2019-11-22 NOTE — PROGRESS NOTES
HISTORY:    Follow-up for positive urine culture  at [facility] recently  Hospitalized for several days, and then rehab  Feeling better although still quite weak  TURP summer 2019 with slow return of bladder function  We removed his SP tube about two months postop, when his PVR was in the range of 300  He thinks flow has improved slightly since that time  ASSESSMENT / PLAN:    PVR only 170 mL today  Cannot give a urine today, he will have a urine culture done at lab next week  And, he knows he can turn a urine culture any time the future if he has signs and symptoms of infection    Follow-up six months with PVR    The following portions of the patient's history were reviewed and updated as appropriate: allergies, current medications, past family history, past medical history, past social history, past surgical history and problem list     Review of Systems   All other systems reviewed and are negative  Objective:     Physical Exam   Constitutional:   Weak, dependent on walker    Right leg edema   Genitourinary:   Genitourinary Comments: Penis testes no edema or excoriation         No results found for: PSA]  BUN   Date Value Ref Range Status   08/15/2019 32 (H) 5 - 25 mg/dL Final     Creatinine   Date Value Ref Range Status   08/15/2019 1 41 (H) 0 60 - 1 30 mg/dL Final     Comment:     Standardized to IDMS reference method     No components found for: CBC      Patient Active Problem List   Diagnosis    Acute urinary retention    Acute renal failure superimposed on stage 3 chronic kidney disease (HCC)    Essential tremor    Other hyperlipidemia    Other hydronephrosis    Renal insufficiency    Urinary retention with incomplete bladder emptying    BPH with urinary obstruction    Enlarged prostate with urinary obstruction    Penile erosion    Acute cystitis without hematuria        Diagnoses and all orders for this visit:    Acute cystitis without hematuria  -     POCT Measure PVR    BPH with urinary obstruction    Other orders  -     Multiple Vitamins-Minerals (PRESERVISION AREDS PO); Daily  -     Discontinue: propantheline (PROBANTHINE) 15 mg tablet; Take 15 mg by mouth 4 (four) times a day  -     acetaminophen (TYLENOL) 325 mg tablet; Take 650 mg by mouth every 6 (six) hours as needed for mild pain           Patient ID: Raquel Phillip is a 80 y o  male        Current Outpatient Medications:     acetaminophen (TYLENOL) 325 mg tablet, Take 650 mg by mouth every 6 (six) hours as needed for mild pain, Disp: , Rfl:     ascorbic acid (VITAMIN C) 250 mg tablet, Take 500 mg by mouth daily, Disp: , Rfl:     aspirin (ECOTRIN LOW STRENGTH) 81 mg EC tablet, Take 81 mg by mouth every evening  , Disp: , Rfl:     atorvastatin (LIPITOR) 20 mg tablet, Take 20 mg by mouth daily, Disp: , Rfl:     Cholecalciferol (VITAMIN D) 2000 units CAPS, Take by mouth, Disp: , Rfl:     Coenzyme Q10 (COQ10) 200 MG CAPS, Take by mouth daily, Disp: , Rfl:     cyanocobalamin 100 MCG tablet, Take 100 mcg by mouth daily, Disp: , Rfl:     finasteride (PROSCAR) 5 mg tablet, Take 1 tablet (5 mg total) by mouth daily, Disp: 90 tablet, Rfl: 3    gabapentin (NEURONTIN) 100 mg capsule, Take 100 mg by mouth 2 (two) times a day, Disp: , Rfl:     Multiple Vitamins-Minerals (PRESERVISION AREDS PO), Daily, Disp: , Rfl:     mupirocin (BACTROBAN) 2 % ointment, APPLY BY TOPICAL ROUTE EVERY DAY A SMALL AMOUNT TO THE AFFECTED AREA, Disp: , Rfl: 0    Omega-3 Fatty Acids (FISH OIL OMEGA-3 PO), Take by mouth daily 1 tsp , Disp: , Rfl:     propranolol (INDERAL) 20 mg tablet, Take 20 mg by mouth every evening  , Disp: , Rfl:     propranolol (INDERAL) 60 mg tablet, Take 60 mg by mouth every morning  , Disp: , Rfl:     tamsulosin (FLOMAX) 0 4 mg, Take 1 capsule (0 4 mg total) by mouth daily with dinner, Disp: 90 capsule, Rfl: 0    triamcinolone (KENALOG) 0 1 % cream, APPLY THIN COAT TO AFFECTED AREA TWICE A DAY, Disp: , Rfl: 0    Past Medical History:   Diagnosis Date    Back disorder     BPH with urinary obstruction     Bruise     right upper arm    Cancer (HCC)     cheekskin cancer    Cataract     Chronic pain disorder     Neck and knee    Elevated PSA     Essential tremor     History of total right hip replacement 2013    Hyperlipidemia     Hypertension     Indwelling Valentine catheter present     Irregular heartbeat     Knee pain, bilateral     raymond doing stairs    Muscle weakness     uses cane and wheelchair for distance    Nasal congestion     Prostate nodule     Risk for falls     Shortness of breath     "extreme exercise"    Swollen ankles     Teeth missing     Tremor     Wears glasses        Past Surgical History:   Procedure Laterality Date    CATARACT EXTRACTION Bilateral 03/2019, 04/2019    COLONOSCOPY      CYSTOSCOPY      HIP SURGERY      JOINT REPLACEMENT Right     hip    LUMBAR EPIDURAL INJECTION      WA TRANSURETHRAL ELEC-SURG PROSTATECTOM N/A 1/8/2019    Procedure: CYSTOSCOPY, TRANSURETHRAL RESECTION OF PROSTATE (TURP);   Surgeon: Kelly Manjarrez MD;  Location: [facility];  Service: Urology    SKIN BIOPSY      SUPRAPUBIC TUBE PLACEMENT N/A 1/8/2019    Procedure: INSERTION SUPRAPUBIC CATHETER PERCUTANEOUS;  Surgeon: Kelly Manjarrez MD;  Location: [facility];  Service: Urology    TONSILECTOMY AND ADNOIDECTOMY     [id number] [place] BIOPSY Bilateral 2003       Social History

## 2020-01-30 DIAGNOSIS — N40.1 BENIGN PROSTATIC HYPERPLASIA WITH URINARY FREQUENCY: ICD-10-CM

## 2020-01-30 DIAGNOSIS — R35.0 BENIGN PROSTATIC HYPERPLASIA WITH URINARY FREQUENCY: ICD-10-CM

## 2020-01-30 DIAGNOSIS — N40.0 BENIGN PROSTATIC HYPERPLASIA, UNSPECIFIED WHETHER LOWER URINARY TRACT SYMPTOMS PRESENT: ICD-10-CM

## 2020-01-30 RX ORDER — FINASTERIDE 5 MG/1
5 TABLET, FILM COATED ORAL DAILY
Qty: 90 TABLET | Refills: 3 | Status: SHIPPED | OUTPATIENT
Start: 2020-01-30 | End: 2021-02-22

## 2020-01-30 RX ORDER — TAMSULOSIN HYDROCHLORIDE 0.4 MG/1
0.4 CAPSULE ORAL
Qty: 90 CAPSULE | Refills: 3 | Status: SHIPPED | OUTPATIENT
Start: 2020-01-30 | End: 2020-11-24

## 2020-01-30 NOTE — TELEPHONE ENCOUNTER
The patient has an upcoming office visit scheduled for 11/30/20 with Dr Marely Rodriguez in the [facility] location but will run out of medication until then  Requests for both drugs, 90 day supplies with 3 refills of both drugs was queued and forwarded to the Advanced Practitioner covering the [facility] location for approval   Patient wife aware of same

## 2020-02-03 ENCOUNTER — TELEPHONE (OUTPATIENT)
Dept: UROLOGY | Facility: MEDICAL CENTER | Age: 85
End: 2020-02-03

## 2020-02-03 NOTE — TELEPHONE ENCOUNTER
Patient of Dr Sharyn Pyle seen in the [facility] office  Patient wife called and advised that she called [facility] to check when the the medication will be delivered  She was advised that Carol Anderson has to call in to [facility] because they have a question on the finasteride  Please call [phone]  Please advise

## 2020-02-03 NOTE — TELEPHONE ENCOUNTER
Prescriptions for Tamsulosin 0 4mg and Finasteride 5mg were both successfully E-scribed  I called [facility] and spoke with Chester Odonnell Ph   Tamsulsoin is processing without difficulty  The Finasteride could not be refilled because it was already filled at the local retail pharmacy for a 90 day supply  She is to call [facility] and request a refill when she has about 2 weeks worth of drug left  Mrs Arambula verbalized understanding  No further action required

## 2020-07-20 ENCOUNTER — TELEPHONE (OUTPATIENT)
Dept: UROLOGY | Facility: MEDICAL CENTER | Age: 85
End: 2020-07-20

## 2020-11-24 DIAGNOSIS — N40.1 BENIGN PROSTATIC HYPERPLASIA WITH URINARY FREQUENCY: ICD-10-CM

## 2020-11-24 DIAGNOSIS — R35.0 BENIGN PROSTATIC HYPERPLASIA WITH URINARY FREQUENCY: ICD-10-CM

## 2020-11-24 RX ORDER — TAMSULOSIN HYDROCHLORIDE 0.4 MG/1
CAPSULE ORAL
Qty: 90 CAPSULE | Refills: 3 | Status: SHIPPED | OUTPATIENT
Start: 2020-11-24 | End: 2021-07-26

## 2021-02-22 DIAGNOSIS — N40.0 BENIGN PROSTATIC HYPERPLASIA, UNSPECIFIED WHETHER LOWER URINARY TRACT SYMPTOMS PRESENT: ICD-10-CM

## 2021-02-22 RX ORDER — FINASTERIDE 5 MG/1
TABLET, FILM COATED ORAL
Qty: 90 TABLET | Refills: 3 | Status: SHIPPED | OUTPATIENT
Start: 2021-02-22 | End: 2022-02-21

## 2021-07-24 DIAGNOSIS — N40.1 BENIGN PROSTATIC HYPERPLASIA WITH URINARY FREQUENCY: ICD-10-CM

## 2021-07-24 DIAGNOSIS — R35.0 BENIGN PROSTATIC HYPERPLASIA WITH URINARY FREQUENCY: ICD-10-CM

## 2021-07-26 RX ORDER — TAMSULOSIN HYDROCHLORIDE 0.4 MG/1
CAPSULE ORAL
Qty: 90 CAPSULE | Refills: 3 | Status: SHIPPED | OUTPATIENT
Start: 2021-07-26

## 2022-02-20 DIAGNOSIS — N40.0 BENIGN PROSTATIC HYPERPLASIA, UNSPECIFIED WHETHER LOWER URINARY TRACT SYMPTOMS PRESENT: ICD-10-CM

## 2022-02-21 RX ORDER — FINASTERIDE 5 MG/1
TABLET, FILM COATED ORAL
Qty: 90 TABLET | Refills: 3 | Status: SHIPPED | OUTPATIENT
Start: 2022-02-21

## 2022-08-11 DIAGNOSIS — R35.0 BENIGN PROSTATIC HYPERPLASIA WITH URINARY FREQUENCY: ICD-10-CM

## 2022-08-11 DIAGNOSIS — N40.1 BENIGN PROSTATIC HYPERPLASIA WITH URINARY FREQUENCY: ICD-10-CM

## 2022-08-12 RX ORDER — TAMSULOSIN HYDROCHLORIDE 0.4 MG/1
CAPSULE ORAL
Qty: 90 CAPSULE | Refills: 3 | Status: SHIPPED | OUTPATIENT
Start: 2022-08-12

## 2023-01-26 DIAGNOSIS — N40.0 BENIGN PROSTATIC HYPERPLASIA, UNSPECIFIED WHETHER LOWER URINARY TRACT SYMPTOMS PRESENT: ICD-10-CM

## 2023-01-27 RX ORDER — FINASTERIDE 5 MG/1
TABLET, FILM COATED ORAL
Qty: 90 TABLET | Refills: 3 | Status: SHIPPED | OUTPATIENT
Start: 2023-01-27

## 2023-07-13 DIAGNOSIS — N40.1 BENIGN PROSTATIC HYPERPLASIA WITH URINARY FREQUENCY: ICD-10-CM

## 2023-07-13 DIAGNOSIS — R35.0 BENIGN PROSTATIC HYPERPLASIA WITH URINARY FREQUENCY: ICD-10-CM

## 2023-07-18 RX ORDER — TAMSULOSIN HYDROCHLORIDE 0.4 MG/1
CAPSULE ORAL
Qty: 90 CAPSULE | Refills: 3 | Status: SHIPPED | OUTPATIENT
Start: 2023-07-18

## (undated) DEVICE — 3M™ DURAPORE™ SURGICAL TAPE 2 INCHES X 10YARDS (5.0CM X 9.1M) 6ROLLS/CARTON 10CARTONS/CASE 1538-2: Brand: 3M™ DURAPORE™

## (undated) DEVICE — GLOVE INDICATOR PI UNDERGLOVE SZ 7.5 BLUE

## (undated) DEVICE — BAG URINE DRAINAGE 4000ML CONTINUOUS IRR

## (undated) DEVICE — SKIN MARKER DUAL TIP WITH RULER CAP, FLEXIBLE RULER AND LABELS: Brand: DEVON

## (undated) DEVICE — SOFT SILICONE HYDROCELLULAR SACRUM DRESSING WITH LOCK AWAY LAYER: Brand: ALLEVYN LIFE SACRUM (LARGE) PACK OF 10

## (undated) DEVICE — TUBING SUCTION 5MM X 12 FT

## (undated) DEVICE — PACK TUR

## (undated) DEVICE — BASIC SINGLE BASIN-LF: Brand: MEDLINE INDUSTRIES, INC.

## (undated) DEVICE — CATH SUPRAPUBIC PEELAWAY SET  18FR X 12CM

## (undated) DEVICE — Device: Brand: OLYMPUS

## (undated) DEVICE — SUT ETHILON 3-0 PS-1 18 IN 1663G

## (undated) DEVICE — UROCATCH BAG

## (undated) DEVICE — GLOVE SRG BIOGEL 7.5

## (undated) DEVICE — EVACUATOR BLADDER ELLIK DISP STRL

## (undated) DEVICE — INTENDED FOR TISSUE SEPARATION, AND OTHER PROCEDURES THAT REQUIRE A SHARP SURGICAL BLADE TO PUNCTURE OR CUT.: Brand: BARD-PARKER ®  SAFETY SCALPED

## (undated) DEVICE — SCD SEQUENTIAL COMPRESSION COMFORT SLEEVE MEDIUM KNEE LENGTH: Brand: KENDALL SCD

## (undated) DEVICE — BAG URINE DRAINAGE 2000ML ANTI RFLX LF

## (undated) DEVICE — CATH SECURE FOLEY

## (undated) DEVICE — GAUZE SPONGES,16 PLY: Brand: CURITY

## (undated) DEVICE — 3M™ STERI-STRIP™ COMPOUND BENZOIN TINCTURE 40 BAGS/CARTON 4 CARTONS/CASE C1544: Brand: 3M™ STERI-STRIP™

## (undated) DEVICE — DRAPE EQUIPMENT RF WAND

## (undated) DEVICE — INVIEW CLEAR LEGGINGS: Brand: CONVERTORS

## (undated) DEVICE — NEEDLE FASCIAL INCISING 18G 5CM